# Patient Record
Sex: FEMALE | Race: WHITE | Employment: OTHER | URBAN - METROPOLITAN AREA
[De-identification: names, ages, dates, MRNs, and addresses within clinical notes are randomized per-mention and may not be internally consistent; named-entity substitution may affect disease eponyms.]

---

## 2017-04-18 PROBLEM — R40.20 LOSS OF CONSCIOUSNESS (HCC): Status: ACTIVE | Noted: 2017-04-18

## 2017-05-12 PROBLEM — IMO0001 MILD AORTIC SCLEROSIS: Status: ACTIVE | Noted: 2017-05-12

## 2017-05-18 PROBLEM — E23.6 EMPTY SELLA (HCC): Status: ACTIVE | Noted: 2017-05-18

## 2017-11-09 PROBLEM — M19.90 INFLAMMATORY ARTHRITIS: Status: ACTIVE | Noted: 2017-11-09

## 2018-01-03 PROBLEM — M06.9 RHEUMATOID ARTHRITIS, INVOLVING UNSPECIFIED SITE, UNSPECIFIED RHEUMATOID FACTOR PRESENCE: Status: ACTIVE | Noted: 2018-01-03

## 2018-03-15 PROBLEM — M72.2 PLANTAR FASCIITIS, BILATERAL: Status: ACTIVE | Noted: 2018-03-15

## 2018-06-19 PROBLEM — M06.00 SERONEGATIVE RHEUMATOID ARTHRITIS (HCC): Status: ACTIVE | Noted: 2018-01-03

## 2018-08-02 PROBLEM — L98.9 SKIN LESIONS: Status: ACTIVE | Noted: 2018-08-02

## 2018-08-22 PROBLEM — M19.079 ARTHRITIS OF MIDFOOT: Status: ACTIVE | Noted: 2018-08-22

## 2018-11-08 PROBLEM — M19.90 INFLAMMATORY ARTHRITIS: Status: RESOLVED | Noted: 2017-11-09 | Resolved: 2018-11-08

## 2019-06-14 PROBLEM — R40.20 LOSS OF CONSCIOUSNESS (HCC): Status: RESOLVED | Noted: 2017-04-18 | Resolved: 2019-06-14

## 2019-06-14 PROBLEM — M72.2 PLANTAR FASCIITIS, BILATERAL: Status: RESOLVED | Noted: 2018-03-15 | Resolved: 2019-06-14

## 2019-08-14 PROBLEM — M48.062 SPINAL STENOSIS OF LUMBAR REGION WITH NEUROGENIC CLAUDICATION: Status: ACTIVE | Noted: 2019-08-14

## 2019-10-10 NOTE — PAT NURSING NOTE
Verified with blood bank about T and S was in process yest and this am and now it's for future. Spoke with Isidro that she cancelled the test,there's only one signature on the tube.

## 2019-10-11 PROBLEM — Z01.818 PREOP TESTING: Status: ACTIVE | Noted: 2019-10-11

## 2019-10-11 NOTE — ANESTHESIA POSTPROCEDURE EVALUATION
Patient: Veronica Alvarado    Procedure Summary     Date:  10/11/19 Room / Location:  [address] [place] [address] MAIN OR 09 / 300 [facility] MAIN OR    Anesthesia Start:  1141 Anesthesia Stop:      Procedure:  POSTERIOR LUMBAR INTERBODY FUSION - MIS TLIF 1 LEVEL (Left Back) Diagnosis:

## 2019-10-11 NOTE — ANESTHESIA PROCEDURE NOTES
Airway  Urgency: elective      General Information and Staff    Patient location during procedure: OR  Anesthesiologist: Christiano Lane MD  Resident/CRNA: Kaylee Batista CRNA  Performed: CRNA     Indications and Patient Condition  Indications for air

## 2019-10-11 NOTE — H&P
Progress Notes        Dulce Maria Scott is a 60year old female who presents for a pre-operative physical exam. Patient is to have L4-5 minimally invasive lumbar fusion Dr Fred Gerber on 10/11/19 at [facility] AND [facility].       HPI:   Pt complains of back pa FOOD Disp: 60 tablet Rfl: 5   AMLODIPINE BESYLATE 5 MG Oral Tab TAKE 1 TABLET BY MOUTH EVERY DAY Disp: 90 tablet Rfl: 1   ENALAPRIL MALEATE 20 MG Oral Tab TAKE ONE TABLET BY MOUTH ONE TIME DAILY Disp: 90 tablet Rfl: 1   HYDROCHLOROTHIAZIDE 25 MG Oral Tab T gastric bx negative       Family History   Problem Relation Age of Onset   • Heart Disorder Father           chf   • Other (Other) Father           alzheimers   • Hypertension Mother     • Heart Disorder Mother           s/p thoracic aortic aneurysm repair edema     EKG NSR, no significant ST/T wave changes     ASSESSMENT AND PLAN:   Tim Ventura is a 60year old female who presents for a pre-operative physical exam. Patient is to have L4-5 minimally invasive lumbar fusion Dr Rasta Harris on 10/11 at [facility]

## 2019-10-11 NOTE — OPERATIVE REPORT
Pre-postop dx: spinal stenosis, spondylolisthesis L4-5  Proc;  Left L4-5 MIS TLIF [employer] [place] instr.  And cage, allograft, infuse  Raymond/Galdino  GETA  ebl 20 cc  Drains none  Complications: none  To RR stable  #[id number]

## 2019-10-11 NOTE — OPERATIVE REPORT
[facility]    PATIENT'S NAME: MARY Cadet   ATTENDING PHYSICIAN: Morgan Perrin. Geoffrey Bender MD   OPERATING PHYSICIAN: Morgan Perrin.  Geoffrey Bender MD   PATIENT ACCOUNT#:   [id number]    LOCATION:  [facility] PACU 2 [facility] 10  MEDICAL RECORD #:   [id number]       D leg symptoms, bleeding, infection, dural tear, paralysis, nonunion, degeneration of level above or below, DVT, PE, and anesthetic risks. She appears to understand and has elected to proceed.     OPERATIVE TECHNIQUE:  The patient was given uncomplicated gen entered with a shaver.   Under fluoroscopy, we then placed pedicle screws on the patient's right at L4 and L5, tested the screws with triggered EMGs, noted to be within threshold limits, then did a reduction maneuver with the cortez, stabilizing it on the L5 s of each wound. The deep fascia was closed with #1 Vicryl suture, subcutaneous tissue was closed with 2-0 PDS, skin was closed with 4-0 Monocryl and Dermabond. A sterile, dry dressing was applied.   The patient tolerated the procedure well, was extubated,

## 2019-10-12 NOTE — PLAN OF CARE
Problem: Patient Centered Care  Goal: Patient preferences are identified and integrated in the patient's plan of care  Description  Interventions:  - What would you like us to know as we care for you?   - Provide timely, complete, and accurate informatio appropriate  - Communicate ordered activity level and limitations with patient/family  Outcome: Progressing     Problem: PAIN - ADULT  Goal: Verbalizes/displays adequate comfort level or patient's stated pain goal  Description  INTERVENTIONS:  - Encourage post-discharge preferences of patient/family/discharge partner  - Complete POLST form as appropriate  - Assess patient's ability to be responsible for managing their own health  - Refer to Case Management Department for coordinating discharge planning if t

## 2019-10-12 NOTE — CONSULTS
General Medicine Consult      Reason for consult: medical management    Consulted by: Dr. Daniel Jane    PCP: Finn Cortés MD      History of Present Illness: Patient is a 60year old female with mmp including but not limited to HTN, anxiety/depression, GE daily., Disp: 60 tablet, Rfl: 0  Polyethylene Glycol 3350 (MIRALAX) Oral Powder, Take 17 g by mouth daily. , Disp: 510 g, Rfl: 0  Calcium Carb-Cholecalciferol (CALCIUM 600 + D) 600-200 MG-UNIT Oral Tab, Take 1 tablet by mouth 2 (two) times daily. , Disp: 60 Antacid  500 mg Oral BID   • Vitamin C  1,000 mg Oral BID   • amLODIPine Besylate  5 mg Oral Daily   • DULoxetine HCl  30 mg Oral TID   • Pantoprazole Sodium  20 mg [facility]     Continuous Infusing Medication:  • lactated ringers 20 mL/hr at 10/11/19 13 accessory m use   Head:  Normocephalic, without obvious abnormality, atraumatic. Eyes:  Sclera anicteric,  EOMs intact. Lids wnl.       Ears, nose, throat:  external ears and nose within normal limits, hearing intact       Neck: Supple, symmetrical   Lung

## 2019-10-12 NOTE — PHYSICAL THERAPY NOTE
PHYSICAL THERAPY EVALUATION - INPATIENT     Room Number: 418/418-A  Evaluation Date: 10/12/2019  Type of Evaluation: Initial   Physician Order: PT Eval and Treat    Presenting Problem: pt is s/p lumbar sx see sx report .  pt with spine precautions post sx this time. Pt present status is below the patient's pre-admission status. Pt with min assist for bed mobility using log roll sequencing. Pt with brief min assist for sit to stand transfers cues provided .     Pt with CGA to min assist for ambulation spondylolisthesis. PROCEDURE:    1. L5 laminectomy, decompression of L5 nerve root, posterolateral fusion, posterior lumbar interbody fusion, L4-5 (56305). 2.       Separate L4 laminectomy, decompression of L4 nerve roots bilaterally (66214).   3. Depression    • Empty sella ([facility] - noted on CT 5/18/2017   • Esophageal reflux    • Fibromyalgia    • High blood pressure    • HYPERTENSION    • Incontinence     bladder   • Loss of consciousness ([address] 4/18/2017   • Mild aortic sclerosis ([facility] [address] 5/12/2017 activity post sx pain increased to 7/10   back area   Management Techniques: Activity promotion; Body mechanics; Relaxation;Repositioning    COGNITION  · Overall Cognitive Status:  WFL - within functional limits    RANGE OF MOTION AND STRENGTH ASSESSMENT Modifier (G-Code): CK    FUNCTIONAL ABILITY STATUS  Gait Assessment   Gait Assistance: Minimum assistance;Contact guard assist  Distance (ft): 110 ft x 1   Assistive Device: Rolling walker  Pattern: R Decreased stance time;R Steppage;L Steppage;R Foot flat

## 2019-10-12 NOTE — PROGRESS NOTES
[facility] CL[facility]  Progress Note    Dmitry Whatley Patient Status:  Inpatient    3/4/1960 MRN [id number]   Location [facility] 4W/SW/SE Attending Von Rodríguez MD   Hosp Day # 1 PCP Yari Tracey MD     Subjective:  Dmitry Whatley is a(n)

## 2019-10-12 NOTE — OCCUPATIONAL THERAPY NOTE
OCCUPATIONAL THERAPY EVALUATION - INPATIENT     Room Number: 418/418-A  Evaluation Date: 10/12/2019  Type of Evaluation: Initial       Physician Order: IP Consult to Occupational Therapy  Reason for Therapy: ADL/IADL Dysfunction and Discharge Planning Occupational Therapy needs  at this time. Patient will be discharged from Occupational Therapy services. Please re-order if a new functional limitation presents during this admission.     OCCUPATIONAL THERAPY MEDICAL/SOCIAL HISTORY     Problem List  Active  with me when I shower    OCCUPATIONAL THERAPY EXAMINATION      OBJECTIVE  Precautions: Lumbar brace;Spine  Fall Risk: Standard fall risk             PAIN ASSESSMENT  Ratin  Location: lower back  Management Techniques:  Activity promotion;Reposit

## 2019-10-12 NOTE — PLAN OF CARE
Problem: SKIN/TISSUE INTEGRITY - ADULT  Goal: Incision(s), wounds(s) or drain site(s) healing without S/S of infection  Description  INTERVENTIONS:  - Assess and document risk factors for pressure ulcer development  - Assess and document skin integrity ADULT - FALL  Goal: Free from fall injury  Description  INTERVENTIONS:  - Assess pt frequently for physical needs  - Identify cognitive and physical deficits and behaviors that affect risk of falls. - [place] fall precautions as indicated by assessment.

## 2019-10-13 NOTE — PLAN OF CARE
Problem: DISCHARGE PLANNING  Goal: Discharge to home or other facility with appropriate resources  Description  INTERVENTIONS:  - Identify barriers to discharge w/pt and caregiver  - Include patient/family/discharge partner in discharge Suleman Tilley

## 2019-10-13 NOTE — PLAN OF CARE
Problem: MUSCULOSKELETAL - ADULT  Goal: Return mobility to safest level of function  Description  INTERVENTIONS:  - Assess patient stability and activity tolerance for standing, transferring and ambulating w/ or w/o assistive devices  - Assist with trans Problem: DISCHARGE PLANNING  Goal: Discharge to home or other facility with appropriate resources  Description  INTERVENTIONS:  - Identify barriers to discharge w/pt and caregiver  - Include patient/family/discharge partner in discharge Suleman Tilley

## 2019-10-13 NOTE — PROGRESS NOTES
Brief Internal Medicine Note    Full Note to Follow      Pt sitting up, pain manageable.   No cp/sob/n/v/f/c. +U, +flatus    Exam stable    Ok to d/c from IM standpoint

## 2019-10-13 NOTE — DISCHARGE SUMMARY
=    General Medicine Discharge Summary     Patient ID:  Smiley Thomas  60year old  [id number]  3/4/1960    Admit date: 10/11/2019    Discharge date and time: 10/13/2019 11:39 AM     Attending/consult physician  Richard Willoughby MD  Primary Care Physici TECHNIQUE:   Intraoperative exam was performed. Total fluoroscopy time was 80.9 seconds. A total of 2 images were obtained. There has been postoperative changes of pedicle screw and posterior fusion with interbody spacer at L4-L5.          CONCLUSION: Intr Disp-90 tablet, R-0    ENALAPRIL MALEATE 20 MG Oral Tab  TAKE ONE TABLET BY MOUTH ONE TIME DAILY, Normal, Disp-90 tablet, R-0    DULOXETINE HCL 30 MG Oral Cap DR Particles  TAKE ONE CAPSULE BY MOUTH THREE TIMES DAILY, Normal, Disp-90 capsule, R-0    preg[facility] [id number] [place] [address] Follow Up with Alejandra Murrieta MD   Tuesday Dec 10, 2019 10:20 AM           Total Time Coordinating Care: Greater than 30 minutes    Patient had opportunity to ask questions and state understand and

## 2019-10-17 PROBLEM — Z98.890 HISTORY OF BACK SURGERY: Status: ACTIVE | Noted: 2019-10-17

## 2019-10-17 PROBLEM — L98.9 SKIN LESIONS: Status: RESOLVED | Noted: 2018-08-02 | Resolved: 2019-10-17

## 2019-10-17 PROBLEM — Z01.818 PREOP TESTING: Status: RESOLVED | Noted: 2019-10-11 | Resolved: 2019-10-17

## 2020-01-27 PROBLEM — R23.4 FISSURE IN SKIN OF FOOT: Status: ACTIVE | Noted: 2018-08-02

## 2020-01-27 PROBLEM — M06.9 RHEUMATOID ARTHRITIS, INVOLVING UNSPECIFIED SITE, UNSPECIFIED RHEUMATOID FACTOR PRESENCE: Status: ACTIVE | Noted: 2017-11-09

## 2021-02-22 PROBLEM — M54.17 L-S RADICULOPATHY: Status: ACTIVE | Noted: 2021-02-22

## 2021-03-26 PROBLEM — Z98.890 S/P LUMBAR MICRODISCECTOMY: Status: ACTIVE | Noted: 2021-03-26

## 2021-03-26 PROBLEM — M46.92 CERVICAL SPONDYLITIS WITH RADICULITIS (HCC): Status: ACTIVE | Noted: 2021-03-26

## 2021-03-26 PROBLEM — M54.12 CERVICAL SPONDYLITIS WITH RADICULITIS (HCC): Status: ACTIVE | Noted: 2021-03-26

## 2021-12-30 PROBLEM — Z98.1 S/P LUMBAR FUSION: Status: ACTIVE | Noted: 2021-03-26

## 2022-01-31 PROBLEM — R23.4 FISSURE IN SKIN OF FOOT: Status: RESOLVED | Noted: 2018-08-02 | Resolved: 2022-01-31

## 2022-01-31 PROBLEM — M06.9 RHEUMATOID ARTHRITIS, INVOLVING UNSPECIFIED SITE, UNSPECIFIED RHEUMATOID FACTOR PRESENCE: Status: RESOLVED | Noted: 2017-11-09 | Resolved: 2022-01-31

## 2022-07-22 NOTE — ANESTHESIA POSTPROCEDURE EVALUATION
Patient: Rosa Maria Mercado    Procedure Summary     Date: 07/22/22 Room / Location: [address] [place] [address] MAIN OR 04 / [address] [place] [address] MAIN OR    Anesthesia Start: 0732 Anesthesia Stop:     Procedures:       Right lateral fusion lumbar 2-3, cage, lumbar 5-sacral 1, posterior lumbar interbody fusion with instrumentation lumbar 1- Pelvis, cages and allograft (Right Spine Lumbar)      posterior lumbar interbody fusion (N/A Spine Lumbar) Diagnosis: (degenerative disc disease, retained hardware, herniated nucleus pulposus)    Surgeons: Elizabeth Waters MD Anesthesiologist: Russ Pleitez MD    Anesthesia Type: general ASA Status: 3          Anesthesia Type: general    Vitals Value Taken Time   /90 07/22/22 1358   Temp 36.7 07/22/22 1400   Pulse 44 07/22/22 1400   Resp 9 07/22/22 1400   SpO2 100 % 07/22/22 1400   Vitals shown include unvalidated device data. [address] [place] [address] AN Post Evaluation:   Patient Evaluated in PACU  Patient Participation: complete - patient cannot participate  Level of Consciousness: Post-procedure mental status: sleepy.   Pain Score: 0  Pain Management: adequate  Airway Patency:patent  Yes    Cardiovascular Status: acceptable  Respiratory Status: acceptable  Postoperative Hydration acceptable  Comments: Sleepy; on Non-rebreather mask 8 L/min;     IVF 2000 mL  EBL ~400 (600 mL - 200 mL Cell Saver)  Urine output 350 mL          Anmol Thomas CRNA  7/22/2022 2:00 PM

## 2022-07-22 NOTE — ANESTHESIA PROCEDURE NOTES
Airway  Date/Time: 7/22/2022 7:37 AM  Urgency: Elective    Airway not difficult    General Information and Staff    Patient location during procedure: OR  Anesthesiologist: Mladonado Abreu MD  Resident/CRNA: Johanne Gallardo CRNA  Performed: CRNA     Indications and Patient Condition  Indications for airway management: anesthesia  Sedation level: deep  Preoxygenated: yes  Patient position: sniffing  Mask difficulty assessment: 1 - vent by mask    Final Airway Details  Final airway type: endotracheal airway      Successful airway: ETT  Cuffed: yes   Successful intubation technique: direct laryngoscopy  Endotracheal tube insertion site: oral  Blade: [place]  Blade size: #4  ETT size (mm): 7.5    Cormack-Lehane Classification: grade I - full view of glottis  Placement verified by: chest auscultation and capnometry   Cuff volume (mL): 3  Measured from: teeth  ETT to teeth (cm): 21  Number of attempts at approach: 1

## 2022-07-22 NOTE — OPERATIVE REPORT
Pre-postop dx:  Kyphosis L2-3 with DDD, left L5-S1 far lateral HNP, retained hardware  Proc:  Right L2-3 XLIF with [place] 13 mm hyperlordotic cage, infuse, PSF L2-3 with formal Ramirez-gooden osteotomies L2-3, left L5-S1 PLF, PLIF with 9 mm [employer]craft cages, revision of hardware, L1-Pelvis instrumentation, allograft, autograft, intrathecal block  Raymond/Galdino  GETA  Ebl: 650 ml, 250 ml back as CS  Drains: hv x 2 in back  Complications: none  To RR stable  #[id number]

## 2022-07-23 NOTE — CM/SW NOTE
07/23/22 0841   /SW Referral Data   Referral Source Physician   Reason for Referral Discharge planning   Informant Patient   Pertinent Medical Hx   Does patient have an established PCP? Yes   Patient Info   Patient's Current Mental Status at Time of Assessment Alert   Patient's [address]   Number of Levels in Home 1   Number of Stair in Home   (3)   Patient Status Prior to Admission   Independent with ADLs and Mobility Yes  (uses cane)   Discharge Needs   Anticipated D/C needs No anticipated discharge needs     PT/OT to see this am.  Patient up in chair, mood good. / to remain available for support and/or discharge planning.      Dara Munoz MBA BSN RN [address]  RN Case Manager  [phone]

## 2022-07-23 NOTE — CM/SW NOTE
07/23/22 0800   CM/SW Referral Data   Referral Source Physician   Reason for Referral Discharge planning   Informant Patient   Pertinent Medical Hx   Does patient have an established PCP? Yes   Patient Info   Patient's Current Mental Status at Time of Assessment Alert   Patient's [address]   Number of Levels in Home 1   Number of Stair in Home   (3)   Patient Status Prior to Admission   Independent with ADLs and Mobility Yes   Services in place prior to admission   (used cane)   Discharge Needs   Anticipated D/C needs No anticipated discharge needs; Home health care; Outpatient therapy

## 2022-07-23 NOTE — PLAN OF CARE
Patient ambulates 1 person assist with walker. Pain managed with PCA pump. SCDs and SANTOSH hose for DVT prophylaxis. Patient voiding with pina. Surgical dressing clean, dry, and intact. Hemovac in place, output chart in Epic. No acute changes. Vitals signs as charted. Patient in lowest position, bed alarm on, call light within reach, using appropriately.  Plan for PT/OT evaluation this AM.     Problem: Patient Centered Care  Goal: Patient preferences are identified and integrated in the patient's plan of care  Description: Interventions:  - What would you like us to know as we care for you?   - Provide timely, complete, and accurate information to patient/family  - Incorporate patient and family knowledge, values, beliefs, and cultural backgrounds into the planning and delivery of care  - Encourage patient/family to participate in care and decision-making at the level they choose  - Honor patient and family perspectives and choices  Outcome: Progressing     Problem: PAIN - ADULT  Goal: Verbalizes/displays adequate comfort level or patient's stated pain goal  Description: INTERVENTIONS:  - Encourage pt to monitor pain and request assistance  - Assess pain using appropriate pain scale  - Administer analgesics based on type and severity of pain and evaluate response  - Implement non-pharmacological measures as appropriate and evaluate response  - Consider cultural and social influences on pain and pain management  - Manage/alleviate anxiety  - Utilize distraction and/or relaxation techniques  - Monitor for opioid side effects  - Notify MD/LIP if interventions unsuccessful or patient reports new pain  - Anticipate increased pain with activity and pre-medicate as appropriate  Outcome: Progressing     Problem: RISK FOR INFECTION - ADULT  Goal: Absence of fever/infection during anticipated neutropenic period  Description: INTERVENTIONS  - Monitor WBC  - Administer growth factors as ordered  - Implement neutropenic guidelines  Outcome: Progressing     Problem: SAFETY ADULT - FALL  Goal: Free from fall injury  Description: INTERVENTIONS:  - Assess pt frequently for physical needs  - Identify cognitive and physical deficits and behaviors that affect risk of falls.   - [place] fall precautions as indicated by assessment.  - Educate pt/family on patient safety including physical limitations  - Instruct pt to call for assistance with activity based on assessment  - Modify environment to reduce risk of injury  - Provide assistive devices as appropriate  - Consider OT/PT consult to assist with strengthening/mobility  - Encourage toileting schedule  Outcome: Progressing     Problem: DISCHARGE PLANNING  Goal: Discharge to home or other facility with appropriate resources  Description: INTERVENTIONS:  - Identify barriers to discharge w/pt and caregiver  - Include patient/family/discharge partner in discharge planning  - Arrange for needed discharge resources and transportation as appropriate  - Identify discharge learning needs (meds, wound care, etc)  - Arrange for interpreters to assist at discharge as needed  - Consider post-discharge preferences of patient/family/discharge partner  - Complete POLST form as appropriate  - Assess patient's ability to be responsible for managing their own health  - Refer to Case Management Department for coordinating discharge planning if the patient needs post-hospital services based on physician/LIP order or complex needs related to functional status, cognitive ability or social support system  Outcome: Progressing

## 2022-07-24 NOTE — PLAN OF CARE
Patient alert and oriented. Post-op day #2. Dressing in place to medial back, changed by PA this AM. Gel ice in place. LSO to be in place when out of bed. Order faxed to [facility], awaiting delivery. VSS. Receiving IV fluids per MD order. Tolerating diet. Voiding freely. SCDs and Teds for DVT prophylaxis. Percocet and zanaflex provided as needed for pain. Up with standby assist and a walker. Encouraged frequent ambulation and use of incentive spirometer. Fall precautions maintained. Frequent rounding by nursing staff. Plan is home with no needs tomorrow.     Problem: Patient Centered Care  Goal: Patient preferences are identified and integrated in the patient's plan of care  Description: Interventions:  - What would you like us to know as we care for you?   - Provide timely, complete, and accurate information to patient/family  - Incorporate patient and family knowledge, values, beliefs, and cultural backgrounds into the planning and delivery of care  - Encourage patient/family to participate in care and decision-making at the level they choose  - Honor patient and family perspectives and choices  Outcome: Progressing     Problem: PAIN - ADULT  Goal: Verbalizes/displays adequate comfort level or patient's stated pain goal  Description: INTERVENTIONS:  - Encourage pt to monitor pain and request assistance  - Assess pain using appropriate pain scale  - Administer analgesics based on type and severity of pain and evaluate response  - Implement non-pharmacological measures as appropriate and evaluate response  - Consider cultural and social influences on pain and pain management  - Manage/alleviate anxiety  - Utilize distraction and/or relaxation techniques  - Monitor for opioid side effects  - Notify MD/LIP if interventions unsuccessful or patient reports new pain  - Anticipate increased pain with activity and pre-medicate as appropriate  Outcome: Progressing     Problem: RISK FOR INFECTION - ADULT  Goal: Absence of fever/infection during anticipated neutropenic period  Description: INTERVENTIONS  - Monitor WBC  - Administer growth factors as ordered  - Implement neutropenic guidelines  Outcome: Progressing     Problem: SAFETY ADULT - FALL  Goal: Free from fall injury  Description: INTERVENTIONS:  - Assess pt frequently for physical needs  - Identify cognitive and physical deficits and behaviors that affect risk of falls.   - [place] fall precautions as indicated by assessment.  - Educate pt/family on patient safety including physical limitations  - Instruct pt to call for assistance with activity based on assessment  - Modify environment to reduce risk of injury  - Provide assistive devices as appropriate  - Consider OT/PT consult to assist with strengthening/mobility  - Encourage toileting schedule  Outcome: Progressing     Problem: DISCHARGE PLANNING  Goal: Discharge to home or other facility with appropriate resources  Description: INTERVENTIONS:  - Identify barriers to discharge w/pt and caregiver  - Include patient/family/discharge partner in discharge planning  - Arrange for needed discharge resources and transportation as appropriate  - Identify discharge learning needs (meds, wound care, etc)  - Arrange for interpreters to assist at discharge as needed  - Consider post-discharge preferences of patient/family/discharge partner  - Complete POLST form as appropriate  - Assess patient's ability to be responsible for managing their own health  - Refer to Case Management Department for coordinating discharge planning if the patient needs post-hospital services based on physician/LIP order or complex needs related to functional status, cognitive ability or social support system  Outcome: Progressing

## 2022-07-24 NOTE — PLAN OF CARE
Patient alert and oriented. Post-op day #1. Dressing in place to medial back. Gel ice in place. VSS. Receiving IV fluids per MD order. Hemovac drain removed by MD this AM, no output. Tolerating diet. Rojas removed, voiding freely. SCDs and Teds for DVT prophylaxis. PCA pump discontinued, Percocet provided as needed for pain. Up with standby assist and a walker. Encouraged frequent ambulation and use of incentive spirometer. Fall precautions maintained- bed alarm on, bed locked in lowest position, call light and personal belongings within reach, non-skid socks in place to bilateral feet. Frequent rounding by nursing staff. Plan is home with no needs pending medical clearance.      Problem: Patient Centered Care  Goal: Patient preferences are identified and integrated in the patient's plan of care  Description: Interventions:  - What would you like us to know as we care for you?   - Provide timely, complete, and accurate information to patient/family  - Incorporate patient and family knowledge, values, beliefs, and cultural backgrounds into the planning and delivery of care  - Encourage patient/family to participate in care and decision-making at the level they choose  - Honor patient and family perspectives and choices  Outcome: Progressing     Problem: PAIN - ADULT  Goal: Verbalizes/displays adequate comfort level or patient's stated pain goal  Description: INTERVENTIONS:  - Encourage pt to monitor pain and request assistance  - Assess pain using appropriate pain scale  - Administer analgesics based on type and severity of pain and evaluate response  - Implement non-pharmacological measures as appropriate and evaluate response  - Consider cultural and social influences on pain and pain management  - Manage/alleviate anxiety  - Utilize distraction and/or relaxation techniques  - Monitor for opioid side effects  - Notify MD/LIP if interventions unsuccessful or patient reports new pain  - Anticipate increased pain with activity and pre-medicate as appropriate  Outcome: Progressing     Problem: RISK FOR INFECTION - ADULT  Goal: Absence of fever/infection during anticipated neutropenic period  Description: INTERVENTIONS  - Monitor WBC  - Administer growth factors as ordered  - Implement neutropenic guidelines  Outcome: Progressing     Problem: SAFETY ADULT - FALL  Goal: Free from fall injury  Description: INTERVENTIONS:  - Assess pt frequently for physical needs  - Identify cognitive and physical deficits and behaviors that affect risk of falls.   - [place] fall precautions as indicated by assessment.  - Educate pt/family on patient safety including physical limitations  - Instruct pt to call for assistance with activity based on assessment  - Modify environment to reduce risk of injury  - Provide assistive devices as appropriate  - Consider OT/PT consult to assist with strengthening/mobility  - Encourage toileting schedule  Outcome: Progressing     Problem: DISCHARGE PLANNING  Goal: Discharge to home or other facility with appropriate resources  Description: INTERVENTIONS:  - Identify barriers to discharge w/pt and caregiver  - Include patient/family/discharge partner in discharge planning  - Arrange for needed discharge resources and transportation as appropriate  - Identify discharge learning needs (meds, wound care, etc)  - Arrange for interpreters to assist at discharge as needed  - Consider post-discharge preferences of patient/family/discharge partner  - Complete POLST form as appropriate  - Assess patient's ability to be responsible for managing their own health  - Refer to Case Management Department for coordinating discharge planning if the patient needs post-hospital services based on physician/LIP order or complex needs related to functional status, cognitive ability or social support system  Outcome: Progressing

## 2022-07-24 NOTE — PLAN OF CARE
No acute changes overnight. Pain control with PRN percocet. Tele monitoring in place. Pt still needs LSO brace delivered. Ambulating with SBA and walker. Plan to discharge home when cleared.      Problem: Patient Centered Care  Goal: Patient preferences are identified and integrated in the patient's plan of care  Description: Interventions:  - What would you like us to know as we care for you?   - Provide timely, complete, and accurate information to patient/family  - Incorporate patient and family knowledge, values, beliefs, and cultural backgrounds into the planning and delivery of care  - Encourage patient/family to participate in care and decision-making at the level they choose  - Honor patient and family perspectives and choices  Outcome: Progressing     Problem: PAIN - ADULT  Goal: Verbalizes/displays adequate comfort level or patient's stated pain goal  Description: INTERVENTIONS:  - Encourage pt to monitor pain and request assistance  - Assess pain using appropriate pain scale  - Administer analgesics based on type and severity of pain and evaluate response  - Implement non-pharmacological measures as appropriate and evaluate response  - Consider cultural and social influences on pain and pain management  - Manage/alleviate anxiety  - Utilize distraction and/or relaxation techniques  - Monitor for opioid side effects  - Notify MD/LIP if interventions unsuccessful or patient reports new pain  - Anticipate increased pain with activity and pre-medicate as appropriate  Outcome: Progressing     Problem: RISK FOR INFECTION - ADULT  Goal: Absence of fever/infection during anticipated neutropenic period  Description: INTERVENTIONS  - Monitor WBC  - Administer growth factors as ordered  - Implement neutropenic guidelines  Outcome: Progressing     Problem: SAFETY ADULT - FALL  Goal: Free from fall injury  Description: INTERVENTIONS:  - Assess pt frequently for physical needs  - Identify cognitive and physical deficits and behaviors that affect risk of falls.   - [place] fall precautions as indicated by assessment.  - Educate pt/family on patient safety including physical limitations  - Instruct pt to call for assistance with activity based on assessment  - Modify environment to reduce risk of injury  - Provide assistive devices as appropriate  - Consider OT/PT consult to assist with strengthening/mobility  - Encourage toileting schedule  Outcome: Progressing     Problem: DISCHARGE PLANNING  Goal: Discharge to home or other facility with appropriate resources  Description: INTERVENTIONS:  - Identify barriers to discharge w/pt and caregiver  - Include patient/family/discharge partner in discharge planning  - Arrange for needed discharge resources and transportation as appropriate  - Identify discharge learning needs (meds, wound care, etc)  - Arrange for interpreters to assist at discharge as needed  - Consider post-discharge preferences of patient/family/discharge partner  - Complete POLST form as appropriate  - Assess patient's ability to be responsible for managing their own health  - Refer to Case Management Department for coordinating discharge planning if the patient needs post-hospital services based on physician/LIP order or complex needs related to functional status, cognitive ability or social support system  Outcome: Progressing

## 2022-07-24 NOTE — PHYSICAL THERAPY NOTE
PHYSICAL THERAPY TREATMENT NOTE - INPATIENT     Room Number: 228/552-V       Presenting Problem: L2/3,L5/S1 fusion, removal and reinsertion of hardward L1-S1    Problem List  Active Problems:    s/p left L1-2 XLIF, left L3-L4 MIS TLIF; 12/21/2021      PHYSICAL THERAPY ASSESSMENT   Chart reviewed. RN Lesley Hilliard approved participation in physical therapy. PPE worn by therapist: mask and gloves. Patient was wearing a mask during session. Patient presented in bed with 8/10 pain. Patient with good  progress towards goals during this session. Education provided on Spine precautions, Physical therapy plan of care and physiological benefits of out of bed mobility. Patient with good carryover. Pt is received sitting EOB with RN present and was cleared for therapy session. Pt has been up ad edgar in her room per pt. Pt is mod I with sit<>stand transfers with the RW. Pt was able to AMB about 200' with the RW SBA. Pt with decreased marie and step length but with very good balance and safety awareness. Pt AMB to the stair well for stair training. Pt was educated and able to negotiate 3 stairs with 1 HR SBA. Pt is cued for proper technique. Returned pt back to the room and to sitting in the chair with all needs within reach. Pt reviewed and maintained all spinal precautions. Pt is on track to dc to home once medically cleared. Reported to the RN on the status of the pt. Bed mobility: NT  Transfers: Modified independent  Gait Assistance: Supervision  Distance (ft): 200'  Assistive Device: Rolling walker  Pattern:  (narrow GEORGE)          . Patient was left in bedside chair at end of session with all needs in reach. The patient's Approx Degree of Impairment: 28.97% has been calculated based on documentation in the [facility] '6 clicks' Inpatient Basic Mobility Short Form. Research supports that patients with this level of impairment may benefit from Home with no services. RN aware of patient status post session.     DISCHARGE RECOMMENDATIONS  PT Discharge Recommendations: Home; Intermittent Supervision     PLAN  PT Treatment Plan: Bed mobility; Body mechanics; Patient education;Gait training;Strengthening;Stoop training;Stair training;Transfer training;Balance training    SUBJECTIVE  Pt was agreeable to therapy session. OBJECTIVE  Precautions: Spine    WEIGHT BEARING RESTRICTION                   PAIN ASSESSMENT   Ratin  Location: back  Management Techniques: Activity promotion; Body mechanics; Relaxation;Repositioning    BALANCE                                                                                                                       Static Sitting: Good  Dynamic Sitting: Fair +           Static Standing: Fair  Dynamic Standing: Fair    ACTIVITY TOLERANCE                         O2 WALK       AM-PAC '6-Clicks' INPATIENT SHORT FORM - BASIC MOBILITY  How much difficulty does the patient currently have. .. Patient Difficulty: Turning over in bed (including adjusting bedclothes, sheets and blankets)?: None   Patient Difficulty: Sitting down on and standing up from a chair with arms (e.g., wheelchair, bedside commode, etc.): None   Patient Difficulty: Moving from lying on back to sitting on the side of the bed?: None   How much help from another person does the patient currently need. .. Help from Another: Moving to and from a bed to a chair (including a wheelchair)?: A Little   Help from Another: Need to walk in hospital room?: A Little   Help from Another: Climbing 3-5 steps with a railing?: A Little     AM-PAC Score:  Raw Score: 21   Approx Degree of Impairment: 28.97%   Standardized Score (AM-PAC Scale): 50.25   CMS Modifier (G-Code): CJ          Patient End of Session: Up in chair;Needs met;Call light within reach;RN aware of session/findings; All patient questions and concerns addressed    CURRENT GOALS     Goals to be met by: 2022  Patient Goal Patient's self-stated goal is: to go home   Goal #1 Patient is able to demonstrate supine - sit EOB @ level: independent     Goal #1   Current Status NT received sitting EOB   Goal #2 Patient is able to demonstrate transfers EOB to/from [facility] at assistance level: independent with none     Goal #2  Current Status Mod I with the RW   Goal #3 Patient is able to ambulate 400 feet with assist device: none at assistance level: independent   Goal #3   Current Status 200' with the RW SBA   Goal #4 Patient will negotiate 3 stairs/one curb w/ assistive device and supervision   Goal #4   Current Status 3 stairs with 1HR SBA   Goal #5 Patient to demonstrate independence with home activity/exercise instructions provided to patient in preparation for discharge.    Goal #5   Current Status IN PROGRESS   Goal #6    Goal #6  Current Status

## 2022-07-25 NOTE — PLAN OF CARE
POD 3. A&O x4. CPAP on overnight. Remote tele. Voiding freely. Pain managed with percocet and zanaflex PRN. Up with standby assist and walker. LSO brace when ambulating. Plan is to discharge home when cleared.     Problem: Patient Centered Care  Goal: Patient preferences are identified and integrated in the patient's plan of care  Description: Interventions:  - What would you like us to know as we care for you?   - Provide timely, complete, and accurate information to patient/family  - Incorporate patient and family knowledge, values, beliefs, and cultural backgrounds into the planning and delivery of care  - Encourage patient/family to participate in care and decision-making at the level they choose  - Honor patient and family perspectives and choices  Outcome: Progressing     Problem: PAIN - ADULT  Goal: Verbalizes/displays adequate comfort level or patient's stated pain goal  Description: INTERVENTIONS:  - Encourage pt to monitor pain and request assistance  - Assess pain using appropriate pain scale  - Administer analgesics based on type and severity of pain and evaluate response  - Implement non-pharmacological measures as appropriate and evaluate response  - Consider cultural and social influences on pain and pain management  - Manage/alleviate anxiety  - Utilize distraction and/or relaxation techniques  - Monitor for opioid side effects  - Notify MD/LIP if interventions unsuccessful or patient reports new pain  - Anticipate increased pain with activity and pre-medicate as appropriate  Outcome: Progressing     Problem: RISK FOR INFECTION - ADULT  Goal: Absence of fever/infection during anticipated neutropenic period  Description: INTERVENTIONS  - Monitor WBC  - Administer growth factors as ordered  - Implement neutropenic guidelines  Outcome: Progressing     Problem: SAFETY ADULT - FALL  Goal: Free from fall injury  Description: INTERVENTIONS:  - Assess pt frequently for physical needs  - Identify cognitive and physical deficits and behaviors that affect risk of falls.   - [place] fall precautions as indicated by assessment.  - Educate pt/family on patient safety including physical limitations  - Instruct pt to call for assistance with activity based on assessment  - Modify environment to reduce risk of injury  - Provide assistive devices as appropriate  - Consider OT/PT consult to assist with strengthening/mobility  - Encourage toileting schedule  Outcome: Progressing     Problem: DISCHARGE PLANNING  Goal: Discharge to home or other facility with appropriate resources  Description: INTERVENTIONS:  - Identify barriers to discharge w/pt and caregiver  - Include patient/family/discharge partner in discharge planning  - Arrange for needed discharge resources and transportation as appropriate  - Identify discharge learning needs (meds, wound care, etc)  - Arrange for interpreters to assist at discharge as needed  - Consider post-discharge preferences of patient/family/discharge partner  - Complete POLST form as appropriate  - Assess patient's ability to be responsible for managing their own health  - Refer to Case Management Department for coordinating discharge planning if the patient needs post-hospital services based on physician/LIP order or complex needs related to functional status, cognitive ability or social support system  Outcome: Progressing

## 2022-07-25 NOTE — PLAN OF CARE
Problem: Patient Centered Care  Goal: Patient preferences are identified and integrated in the patient's plan of care  Description: Interventions:  - What would you like us to know as we care for you? From home with  and he's her support system.   - Provide timely, complete, and accurate information to patient/family  - Incorporate patient and family knowledge, values, beliefs, and cultural backgrounds into the planning and delivery of care  - Encourage patient/family to participate in care and decision-making at the level they choose  - Honor patient and family perspectives and choices  7/25/2022 1446 by Kathy Greco RN  Outcome: Adequate for Discharge  7/25/2022 1443 by Kathy Greco RN  Outcome: Adequate for Discharge  7/25/2022 1159 by Kathy Greco RN  Outcome: Progressing     Problem: PAIN - ADULT  Goal: Verbalizes/displays adequate comfort level or patient's stated pain goal  Description: INTERVENTIONS:  - Encourage pt to monitor pain and request assistance  - Assess pain using appropriate pain scale  - Administer analgesics based on type and severity of pain and evaluate response  - Implement non-pharmacological measures as appropriate and evaluate response  - Consider cultural and social influences on pain and pain management  - Manage/alleviate anxiety  - Utilize distraction and/or relaxation techniques  - Monitor for opioid side effects  - Notify MD/LIP if interventions unsuccessful or patient reports new pain  - Anticipate increased pain with activity and pre-medicate as appropriate  7/25/2022 1446 by Kathy Greco RN  Outcome: Adequate for Discharge  7/25/2022 1443 by Kathy Greco RN  Outcome: Adequate for Discharge  7/25/2022 1159 by Kathy Greco RN  Outcome: Progressing     Problem: RISK FOR INFECTION - ADULT  Goal: Absence of fever/infection during anticipated neutropenic period  Description: INTERVENTIONS  - Monitor WBC  - Administer growth factors as ordered  - Implement neutropenic guidelines  7/25/2022 1446 by Rika Mccracken RN  Outcome: Adequate for Discharge  7/25/2022 1443 by Rika Mccracken RN  Outcome: Adequate for Discharge  7/25/2022 1159 by Rika Mccracken RN  Outcome: Progressing     Problem: SAFETY ADULT - FALL  Goal: Free from fall injury  Description: INTERVENTIONS:  - Assess pt frequently for physical needs  - Identify cognitive and physical deficits and behaviors that affect risk of falls.   - [place] fall precautions as indicated by assessment.  - Educate pt/family on patient safety including physical limitations  - Instruct pt to call for assistance with activity based on assessment  - Modify environment to reduce risk of injury  - Provide assistive devices as appropriate  - Consider OT/PT consult to assist with strengthening/mobility  - Encourage toileting schedule  7/25/2022 1446 by Rika Mccracken RN  Outcome: Adequate for Discharge  7/25/2022 1443 by Rika Mccracken RN  Outcome: Adequate for Discharge  7/25/2022 1159 by Rika Mccracken RN  Outcome: Progressing     Problem: DISCHARGE PLANNING  Goal: Discharge to home or other facility with appropriate resources  Description: INTERVENTIONS:  - Identify barriers to discharge w/pt and caregiver  - Include patient/family/discharge partner in discharge planning  - Arrange for needed discharge resources and transportation as appropriate  - Identify discharge learning needs (meds, wound care, etc)  - Arrange for interpreters to assist at discharge as needed  - Consider post-discharge preferences of patient/family/discharge partner  - Complete POLST form as appropriate  - Assess patient's ability to be responsible for managing their own health  - Refer to Case Management Department for coordinating discharge planning if the patient needs post-hospital services based on physician/LIP order or complex needs related to functional status, cognitive ability or social support system  7/25/2022 1446 by Antonia Vivar RN  Outcome: Adequate for Discharge  7/25/2022 1443 by Antonia Vivar RN  Outcome: Adequate for Discharge  7/25/2022 1159 by Antonia Vivar RN  Outcome: Progressing     Problem: Patient/Family Goals  Goal: Patient/Family Long Term Goal  Description: Patient's Long Term Goal: Patient will have no complications from surgery. Interventions:  - Up with walker and LSO brace as much as tolerated. - No bending, heavy lifting nor twisting above waist area. - Monitor incision for any signs of infection.  - Pain management with oral medication.  - May use ice to incision to prevent swelling or help reduce pain. - SANTOSH hose to bilateral legs to prevent blood clots. - See additional Care Plan goals for specific interventions  7/25/2022 1446 by Antonia Vivar RN  Outcome: Adequate for Discharge  7/25/2022 1443 by Antonia Vivar RN  Outcome: Adequate for Discharge  7/25/2022 1159 by Antonia Vivar RN  Outcome: Progressing  Goal: Patient/Family Short Term Goal  Description: Patient's Short Term Goal: Home when stable. Interventions:   - No bending, heavy lifting nor twisting above waist area. - Monitor incision for any signs of infection.  - Pain management with oral medication.  - May use ice to incision to prevent swelling or help reduce pain. - SANTOSH hose/SCD's to bilateral legs to prevent blood clots.  - PT/OT as ordered. - Up with walker and LSO brace as much as tolerated.   - See additional Care Plan goals for specific interventions  7/25/2022 1446 by Antonia Vivar RN  Outcome: Adequate for Discharge  7/25/2022 1443 by Antonia Vivar RN  Outcome: Adequate for Discharge  7/25/2022 1159 by Antonia Vivar RN  Outcome: Progressing     Problem: SKIN/TISSUE INTEGRITY - ADULT  Goal: Incision(s), wounds(s) or drain site(s) healing without S/S of infection  Description: INTERVENTIONS:  - Assess and document risk factors for pressure ulcer development  - Assess and document skin integrity  - Assess and document dressing/incision, wound bed, drain sites and surrounding tissue  - Implement wound care per orders  - Initiate isolation precautions as appropriate  - Initiate Pressure Ulcer prevention bundle as indicated  7/25/2022 1446 by Kathy Greco RN  Outcome: Adequate for Discharge  7/25/2022 1443 by Kathy Greco RN  Outcome: Adequate for Discharge  7/25/2022 1159 by Kathy Greco RN  Outcome: Progressing     Problem: MUSCULOSKELETAL - ADULT  Goal: Return mobility to safest level of function  Description: INTERVENTIONS:  - Assess patient stability and activity tolerance for standing, transferring and ambulating w/ or w/o assistive devices  - Assist with transfers and ambulation using safe patient handling equipment as needed  - Ensure adequate protection for wounds/incisions during mobilization  - Obtain PT/OT consults as needed  - Advance activity as appropriate  - Communicate ordered activity level and limitations with patient/family  7/25/2022 1446 by Kathy Greco RN  Outcome: Adequate for Discharge  7/25/2022 1443 by Kathy Greco RN  Outcome: Adequate for Discharge  7/25/2022 1159 by Kathy Greco RN  Outcome: Progressing  Goal: Maintain proper alignment of affected body part  Description: INTERVENTIONS:  - Support and protect limb and body alignment per provider's orders  - Instruct and reinforce with patient and family use of appropriate assistive device and precautions (e.g. spinal or hip dislocation precautions)  7/25/2022 1446 by Kathy Greco RN  Outcome: Adequate for Discharge  7/25/2022 1443 by Kathy Greco RN  Outcome: Adequate for Discharge  7/25/2022 1159 by Kathy Greco RN  Outcome: Progressing

## 2022-07-25 NOTE — PLAN OF CARE
Problem: Patient Centered Care  Goal: Patient preferences are identified and integrated in the patient's plan of care  Description: Interventions:  - What would you like us to know as we care for you? From home with  and he's her support system. - Provide timely, complete, and accurate information to patient/family  - Incorporate patient and family knowledge, values, beliefs, and cultural backgrounds into the planning and delivery of care  - Encourage patient/family to participate in care and decision-making at the level they choose  - Honor patient and family perspectives and choices  Outcome: Progressing     Problem: PAIN - ADULT  Goal: Verbalizes/displays adequate comfort level or patient's stated pain goal  Description: INTERVENTIONS:  - Encourage pt to monitor pain and request assistance  - Assess pain using appropriate pain scale  - Administer analgesics based on type and severity of pain and evaluate response  - Implement non-pharmacological measures as appropriate and evaluate response  - Consider cultural and social influences on pain and pain management  - Manage/alleviate anxiety  - Utilize distraction and/or relaxation techniques  - Monitor for opioid side effects  - Notify MD/LIP if interventions unsuccessful or patient reports new pain  - Anticipate increased pain with activity and pre-medicate as appropriate  Outcome: Progressing     Problem: RISK FOR INFECTION - ADULT  Goal: Absence of fever/infection during anticipated neutropenic period  Description: INTERVENTIONS  - Monitor WBC  - Administer growth factors as ordered  - Implement neutropenic guidelines  Outcome: Progressing     Problem: SAFETY ADULT - FALL  Goal: Free from fall injury  Description: INTERVENTIONS:  - Assess pt frequently for physical needs  - Identify cognitive and physical deficits and behaviors that affect risk of falls.   - [place] fall precautions as indicated by assessment.  - Educate pt/family on patient safety including physical limitations  - Instruct pt to call for assistance with activity based on assessment  - Modify environment to reduce risk of injury  - Provide assistive devices as appropriate  - Consider OT/PT consult to assist with strengthening/mobility  - Encourage toileting schedule  Outcome: Progressing     Problem: DISCHARGE PLANNING  Goal: Discharge to home or other facility with appropriate resources  Description: INTERVENTIONS:  - Identify barriers to discharge w/pt and caregiver  - Include patient/family/discharge partner in discharge planning  - Arrange for needed discharge resources and transportation as appropriate  - Identify discharge learning needs (meds, wound care, etc)  - Arrange for interpreters to assist at discharge as needed  - Consider post-discharge preferences of patient/family/discharge partner  - Complete POLST form as appropriate  - Assess patient's ability to be responsible for managing their own health  - Refer to Case Management Department for coordinating discharge planning if the patient needs post-hospital services based on physician/LIP order or complex needs related to functional status, cognitive ability or social support system  Outcome: Progressing     Problem: Patient/Family Goals  Goal: Patient/Family Long Term Goal  Description: Patient's Long Term Goal: Patient will have no complications from surgery. Interventions:  - Up with walker and LSO brace as much as tolerated. - No bending, heavy lifting nor twisting above waist area. - Monitor incision for any signs of infection.  - Pain management with oral medication.  - May use ice to incision to prevent swelling or help reduce pain. - SANTOSH hose to bilateral legs to prevent blood clots. - See additional Care Plan goals for specific interventions  Outcome: Progressing  Goal: Patient/Family Short Term Goal  Description: Patient's Short Term Goal: Home when stable.     Interventions:   - No bending, heavy lifting nor twisting above waist area. - Monitor incision for any signs of infection.  - Pain management with oral medication.  - May use ice to incision to prevent swelling or help reduce pain. - SANTOSH hose/SCD's to bilateral legs to prevent blood clots.  - PT/OT as ordered. - Up with walker and LSO brace as much as tolerated. - See additional Care Plan goals for specific interventions  Outcome: Progressing     Problem: SKIN/TISSUE INTEGRITY - ADULT  Goal: Incision(s), wounds(s) or drain site(s) healing without S/S of infection  Description: INTERVENTIONS:  - Assess and document risk factors for pressure ulcer development  - Assess and document skin integrity  - Assess and document dressing/incision, wound bed, drain sites and surrounding tissue  - Implement wound care per orders  - Initiate isolation precautions as appropriate  - Initiate Pressure Ulcer prevention bundle as indicated  Outcome: Progressing     Problem: MUSCULOSKELETAL - ADULT  Goal: Return mobility to safest level of function  Description: INTERVENTIONS:  - Assess patient stability and activity tolerance for standing, transferring and ambulating w/ or w/o assistive devices  - Assist with transfers and ambulation using safe patient handling equipment as needed  - Ensure adequate protection for wounds/incisions during mobilization  - Obtain PT/OT consults as needed  - Advance activity as appropriate  - Communicate ordered activity level and limitations with patient/family  Outcome: Progressing  Goal: Maintain proper alignment of affected body part  Description: INTERVENTIONS:  - Support and protect limb and body alignment per provider's orders  - Instruct and reinforce with patient and family use of appropriate assistive device and precautions (e.g. spinal or hip dislocation precautions)  Outcome: Progressing    Patient is alert and oriented, aware to call for help as needed. Patient is currently in room air, denies shortness of breathing nor chest pain. Patient is up with walker, awaits for her LSO brace to be delivered. Patient's pain is managed with oral medication. Patient is voiding well, passing gas but denies have a bowel movement. Patient will be going home this afternoon with .

## 2022-07-25 NOTE — PHYSICAL THERAPY NOTE
PHYSICAL THERAPY TREATMENT NOTE - INPATIENT     Room Number: 324/494-X       Presenting Problem: L2/3,L5/S1 fusion, removal and reinsertion of hardward L1-S1       Problem List  Active Problems:    s/p left L1-2 XLIF, left L3-L4 MIS TLIF; 2021      PHYSICAL THERAPY ASSESSMENT     Pt seen daily. Chart reviewed,RN approved pt participation in PT RX. CGA for bed mobility and transfer;extra time provided to complete task. Spinal precautions reviewed;education;pt recall 3/3 spinal precautions. Pt amb 2 x 100 ft with RW and CGA;provided cuing for gait pattern as well as for postural awareness. Navigated 4 stairs with CGA. Ther ex;pt ended session sitting up in chair;call light within reach. RN aware. Family present;family education;all questions and concerns addressed. The patient's Approx Degree of Impairment: 28.97% has been calculated based on documentation in the [facility] '6 clicks' Inpatient Basic Mobility Short Form. Research supports that patients with this level of impairment may benefit from Home ; Intermittent supervision. Alex Hwang DISCHARGE RECOMMENDATIONS  PT Discharge Recommendations: Home; Intermittent Supervision     PLAN  PT Treatment Plan: Bed mobility; Endurance; Patient education;Gait training  Frequency (Obs): Daily    SUBJECTIVE  Pt reports being ready for PT RX    OBJECTIVE  Precautions: Spine    WEIGHT BEARING RESTRICTION                PAIN ASSESSMENT   Ratin  Location: back  Management Techniques: Activity promotion; Body mechanics; Relaxation;Repositioning    BALANCE  Static Sitting: Good  Dynamic Sitting: Fair +  Static Standing: Fair  Dynamic Standing: Fair    ACTIVITY TOLERANCE                          O2 WALK       AM-PAC '6-Clicks' INPATIENT SHORT FORM - BASIC MOBILITY  How much difficulty does the patient currently have. ..   Patient Difficulty: Turning over in bed (including adjusting bedclothes, sheets and blankets)?: None   Patient Difficulty: Sitting down on and standing up from a chair with arms (e.g., wheelchair, bedside commode, etc.): None   Patient Difficulty: Moving from lying on back to sitting on the side of the bed?: None   How much help from another person does the patient currently need. .. Help from Another: Moving to and from a bed to a chair (including a wheelchair)?: A Little   Help from Another: Need to walk in hospital room?: A Little   Help from Another: Climbing 3-5 steps with a railing?: A Little     AM-PAC Score:  Raw Score: 21   Approx Degree of Impairment: 28.97%   Standardized Score (AM-PAC Scale): 50.25   CMS Modifier (G-Code): CJ    FUNCTIONAL ABILITY STATUS  Functional Mobility/Gait Assessment  Gait Assistance: Contact guard assist  Distance (ft): 2 x 100  Assistive Device: Rolling walker  Pattern: Shuffle  Stairs: Stairs  How Many Stairs: 4  Device: 1 Rail  Assist: Contact guard assist  Pattern: Ascend and Descend  Ascend and Descend : Step to    Additional information:     THERAPEUTIC EXERCISES  Lower Extremity Ankle pumps  Glut sets  Hip AB/AD  Quad sets     Position Supine       Patient End of Session: Up in chair;Call light within reach;RN aware of session/findings;Bracing education provided per handout; All patient questions and concerns addressed    CURRENT GOALS     Patient Goal Patient's self-stated goal is: to go home   Goal #1 Patient is able to demonstrate supine - sit EOB @ level: independent     Goal #1   Current Status CGA   Goal #2 Patient is able to demonstrate transfers EOB to/from [facility] at assistance level: independent with none     Goal #2  Current Status CGA with the RW   Goal #3 Patient is able to ambulate 400 feet with assist device: none at assistance level: independent   Goal #3   Current Status 2  X 100' with the RW CGA   Goal #4 Patient will negotiate 3 stairs/one curb w/ assistive device and supervision   Goal #4   Current Status 4 stairs with 1HR CGA   Goal #5 Patient to demonstrate independence with home activity/exercise instructions provided to patient in preparation for discharge.    Goal #5   Current Status IN PROGRESS   Goal #6    Goal #6  Current Status

## 2022-07-29 NOTE — H&P
[facility]    PATIENT'S NAME: MARY Long   ATTENDING PHYSICIAN: Earnest Zarate. Tamara Rizo MD   PATIENT ACCOUNT#:   [id number]    LOCATION:  [address] [place] [address] #:   [id number]       YOB: 1960  ADMISSION DATE:       07/22/2022    HISTORY AND PHYSICAL EXAMINATION    HISTORY OF PRESENT ILLNESS:  A 69-year-old female who has had progressive back and left leg pain. Multiple previous surgeries. Patient admitted for lateral fusion and laminectomy and fusion with instrumentation. MEDICATIONS:  Norco 10 three tablets per day, duloxetine 30 mg daily, enalapril 20 mg per day, Lyrica 200 mg b.i.d., amlodipine 5 mg per day. ALLERGIES:  None. SOCIAL HISTORY:  She works in an office. She does not smoke. REVIEW OF SYSTEMS:  Negative. PHYSICAL EXAMINATION:    GENERAL:  Healthy-appearing female. NEUROLOGIC:  Walking with a walker because of her severe back and left leg pain. Mild weakness, left EHL and foot dorsiflexors. Decreased sensation in the left L5 dermatome. Positive SLR on the left. Reproducible radicular pain on the left leg. Multiple posterior lumbar incisions. Minimal range of motion of the lumbar spine, secondary to back and left leg pain. MRI shows severe spinal stenosis on the left side at L5-S1 in the foramen, as well as some kyphosis of the upper lumbar spine. Previous multiple-level fusions of the lumbar spine. ASSESSMENT:  Spinal stenosis and kyphosis. PLAN:  Lateral fusion L2-3, posterior instrumentation revision with fusion L1 to pelvis with exchange of instrumentation, laminectomy, and facet osteotomies. Risks, benefits and alternatives explained to the patient in detail. She appears to understand and has elected to proceed. All questions answered. Dictated By Lakesha Rizo MD  d: 07/28/2022 15:01:11  t: 07/28/2022 17:12:55  Job [id number]  TNN/

## 2023-05-30 NOTE — ANESTHESIA PREPROCEDURE EVALUATION
Anesthesia PreOp Note    HPI:     Deniz Prince is a 60year old female who presents for preoperative consultation requested by:  Shun Us MD    Date of Surgery: 10/11/2019    Procedure(s):  POSTERIOR LUMBAR INTERBODY FUSION - MIS TLIF 1 LEVEL  I Incontinence     bladder   • Loss of consciousness ([facility] [address] 4/18/2017   • Mild aortic sclerosis ([facility] [address] 5/12/2017   • Myofascial muscle pain    • BELKIS on CPAP    • OSTEOARTHRITIS     thumb   • Osteoarthritis    • OTHER DISEASES     cts bilat   • OTHER DISEASES 10/9/2019   Multiple Vitamins-Minerals (ONE DAILY WOMENS 50+) Oral Tab Take  by mouth. Disp:  Rfl:  10/4/2019   B Complex-C-Folic Acid (HM VITAMIN B COMPLEX/VITAMIN C) Oral Tab Take  by mouth daily.  Disp:  Rfl:  10/4/2019   [DISCONTINUED] Cyclobenzaprine H CRNA 5 mg at 10/11/19 1416   dexamethasone Sodium Phosphate (DECADRON) 4 MG/ML injection  Intravenous PRN rEiberto Pugh, CRNA 10 mg at 10/11/19 1422     Current Outpatient Medications Ordered in Epic:  Cyclobenzaprine HCl 10 MG Oral Tab Take 1 tablet (10 m Medical: Not on file        Non-medical: Not on file    Tobacco Use      Smoking status: Former Smoker        Years: 17.00        Types: Cigarettes        Quit date: 1998        Years since quittin.2      Smokeless tobacco: Never Used    [employer] stressful. Commutes 15 minutes to work. Lives in house. She cannot take care of yardwork. Finances - mother helps her financially [place] [address] [place] [place]; ; specializes in desserts .  caffeine: 3 daybl General  Monitors and Lines:   SSEP/MEP  Airway:  ETT  Post-op Pain Management: IV analgesics, Local and IV PCA  Informed Consent Plan and Risks Discussed With:  Patient and spouse  Use of Blood Products Discussed With:  Patient and spouse  Blood Product U Skin normal color for race, warm, dry and intact. No evidence of rash.

## 2024-05-06 ENCOUNTER — HOSPITAL ENCOUNTER (OUTPATIENT)
Dept: GENERAL RADIOLOGY | Age: 64
Discharge: HOME | End: 2024-05-06
Attending: ORTHOPAEDIC SURGERY
Payer: MEDICARE

## 2024-05-06 ENCOUNTER — LAB ENCOUNTER (OUTPATIENT)
Dept: LAB | Age: 64
End: 2024-05-06
Attending: ORTHOPAEDIC SURGERY
Payer: MEDICARE

## 2024-05-06 DIAGNOSIS — Z01.818 PREOP TESTING: ICD-10-CM

## 2024-05-06 LAB
ANTIBODY SCREEN: NEGATIVE
RH BLOOD TYPE: POSITIVE

## 2024-05-06 PROCEDURE — 86901 BLOOD TYPING SEROLOGIC RH(D): CPT

## 2024-05-06 PROCEDURE — 86850 RBC ANTIBODY SCREEN: CPT

## 2024-05-06 PROCEDURE — 71046 X-RAY EXAM CHEST 2 VIEWS: CPT | Performed by: ORTHOPAEDIC SURGERY

## 2024-05-06 PROCEDURE — 86900 BLOOD TYPING SEROLOGIC ABO: CPT

## 2024-05-06 RX ORDER — GOLIMUMAB 50 MG/.5ML
50 INJECTION, SOLUTION SUBCUTANEOUS
COMMUNITY
Start: 2023-11-10

## 2024-05-06 RX ORDER — HYDROCODONE BITARTRATE AND ACETAMINOPHEN 10; 325 MG/1; MG/1
1 TABLET ORAL EVERY 6 HOURS PRN
COMMUNITY
Start: 2024-05-06 | End: 2024-05-11

## 2024-05-06 RX ORDER — CYANOCOBALAMIN (VITAMIN B-12) 500 MCG
1 TABLET ORAL DAILY
COMMUNITY

## 2024-05-06 RX ORDER — PREDNISONE 5 MG/1
7.5 TABLET ORAL DAILY
COMMUNITY
Start: 2023-11-13

## 2024-05-08 ENCOUNTER — APPOINTMENT (OUTPATIENT)
Dept: GENERAL RADIOLOGY | Facility: HOSPITAL | Age: 64
DRG: 460 | End: 2024-05-08
Attending: ORTHOPAEDIC SURGERY

## 2024-05-08 ENCOUNTER — ANESTHESIA EVENT (OUTPATIENT)
Dept: SURGERY | Facility: HOSPITAL | Age: 64
DRG: 460 | End: 2024-05-08
Payer: MEDICARE

## 2024-05-08 ENCOUNTER — HOSPITAL ENCOUNTER (INPATIENT)
Facility: HOSPITAL | Age: 64
LOS: 3 days | Discharge: HOME HEALTH | DRG: 460 | End: 2024-05-11
Attending: ORTHOPAEDIC SURGERY | Admitting: ORTHOPAEDIC SURGERY

## 2024-05-08 ENCOUNTER — ANESTHESIA (OUTPATIENT)
Dept: SURGERY | Facility: HOSPITAL | Age: 64
DRG: 460 | End: 2024-05-08
Payer: MEDICARE

## 2024-05-08 DIAGNOSIS — Z01.818 PREOP TESTING: Primary | ICD-10-CM

## 2024-05-08 DIAGNOSIS — Z98.1 S/P FUSION OF THORACIC SPINE: ICD-10-CM

## 2024-05-08 PROCEDURE — 00NX0ZZ RELEASE THORACIC SPINAL CORD, OPEN APPROACH: ICD-10-PCS | Performed by: ORTHOPAEDIC SURGERY

## 2024-05-08 PROCEDURE — 4A133B1 MONITORING OF ARTERIAL PRESSURE, PERIPHERAL, PERCUTANEOUS APPROACH: ICD-10-PCS | Performed by: ANESTHESIOLOGY

## 2024-05-08 PROCEDURE — 0PS404Z REPOSITION THORACIC VERTEBRA WITH INTERNAL FIXATION DEVICE, OPEN APPROACH: ICD-10-PCS | Performed by: ORTHOPAEDIC SURGERY

## 2024-05-08 PROCEDURE — P9045 ALBUMIN (HUMAN), 5%, 250 ML: HCPCS

## 2024-05-08 PROCEDURE — 03HY32Z INSERTION OF MONITORING DEVICE INTO UPPER ARTERY, PERCUTANEOUS APPROACH: ICD-10-PCS | Performed by: ANESTHESIOLOGY

## 2024-05-08 PROCEDURE — 76000 FLUOROSCOPY <1 HR PHYS/QHP: CPT | Performed by: ORTHOPAEDIC SURGERY

## 2024-05-08 PROCEDURE — P9045 ALBUMIN (HUMAN), 5%, 250 ML: HCPCS | Performed by: ANESTHESIOLOGY

## 2024-05-08 PROCEDURE — 4A133J1 MONITORING OF ARTERIAL PULSE, PERIPHERAL, PERCUTANEOUS APPROACH: ICD-10-PCS | Performed by: ANESTHESIOLOGY

## 2024-05-08 PROCEDURE — 0RGA071 FUSION OF THORACOLUMBAR VERTEBRAL JOINT WITH AUTOLOGOUS TISSUE SUBSTITUTE, POSTERIOR APPROACH, POSTERIOR COLUMN, OPEN APPROACH: ICD-10-PCS | Performed by: ORTHOPAEDIC SURGERY

## 2024-05-08 PROCEDURE — 00NY0ZZ RELEASE LUMBAR SPINAL CORD, OPEN APPROACH: ICD-10-PCS | Performed by: ORTHOPAEDIC SURGERY

## 2024-05-08 PROCEDURE — 0RBB0ZZ EXCISION OF THORACOLUMBAR VERTEBRAL DISC, OPEN APPROACH: ICD-10-PCS | Performed by: ORTHOPAEDIC SURGERY

## 2024-05-08 PROCEDURE — 0RG7071 FUSION OF 2 TO 7 THORACIC VERTEBRAL JOINTS WITH AUTOLOGOUS TISSUE SUBSTITUTE, POSTERIOR APPROACH, POSTERIOR COLUMN, OPEN APPROACH: ICD-10-PCS | Performed by: ORTHOPAEDIC SURGERY

## 2024-05-08 PROCEDURE — 88300 SURGICAL PATH GROSS: CPT | Performed by: ORTHOPAEDIC SURGERY

## 2024-05-08 DEVICE — IMPLANTABLE DEVICE: Type: IMPLANTABLE DEVICE | Site: BACK | Status: FUNCTIONAL

## 2024-05-08 DEVICE — IMPLANTABLE DEVICE: Type: IMPLANTABLE DEVICE | Status: FUNCTIONAL

## 2024-05-08 DEVICE — GRAFT BNE SUB CHIP 30CC ALLGRFT CANC FD: Type: IMPLANTABLE DEVICE | Site: BACK | Status: FUNCTIONAL

## 2024-05-08 DEVICE — ONE (1) PACKAGE - CONTAINING 10.0CC
Type: IMPLANTABLE DEVICE | Site: BACK | Status: FUNCTIONAL
Brand: OSTEOSELECT PLUS DBM PUTTY 10.0CC

## 2024-05-08 DEVICE — BONE GRAFT KIT 7510600 INFUSE LARGE
Type: IMPLANTABLE DEVICE | Site: BACK | Status: FUNCTIONAL
Brand: INFUSE® BONE GRAFT

## 2024-05-08 DEVICE — SET SCR SPNL T30 AST TECH: Type: IMPLANTABLE DEVICE | Site: BACK | Status: FUNCTIONAL

## 2024-05-08 RX ORDER — DIAZEPAM 2 MG/1
2 TABLET ORAL EVERY 6 HOURS PRN
Status: DISCONTINUED | OUTPATIENT
Start: 2024-05-08 | End: 2024-05-11

## 2024-05-08 RX ORDER — EPHEDRINE SULFATE 50 MG/ML
INJECTION, SOLUTION INTRAVENOUS AS NEEDED
Status: DISCONTINUED | OUTPATIENT
Start: 2024-05-08 | End: 2024-05-08 | Stop reason: SURG

## 2024-05-08 RX ORDER — DIAZEPAM 5 MG/1
5 TABLET ORAL ONCE
Status: COMPLETED | OUTPATIENT
Start: 2024-05-08 | End: 2024-05-08

## 2024-05-08 RX ORDER — ROCURONIUM BROMIDE 10 MG/ML
INJECTION, SOLUTION INTRAVENOUS AS NEEDED
Status: DISCONTINUED | OUTPATIENT
Start: 2024-05-08 | End: 2024-05-08 | Stop reason: SURG

## 2024-05-08 RX ORDER — LIDOCAINE HYDROCHLORIDE ANHYDROUS AND DEXTROSE MONOHYDRATE .8; 5 G/100ML; G/100ML
INJECTION, SOLUTION INTRAVENOUS CONTINUOUS PRN
Status: DISCONTINUED | OUTPATIENT
Start: 2024-05-08 | End: 2024-05-08 | Stop reason: SURG

## 2024-05-08 RX ORDER — HYDROMORPHONE HYDROCHLORIDE 1 MG/ML
0.4 INJECTION, SOLUTION INTRAMUSCULAR; INTRAVENOUS; SUBCUTANEOUS EVERY 2 HOUR PRN
Status: DISCONTINUED | OUTPATIENT
Start: 2024-05-08 | End: 2024-05-09

## 2024-05-08 RX ORDER — MINERAL OIL AND PETROLATUM 150; 830 MG/G; MG/G
OINTMENT OPHTHALMIC AS NEEDED
Status: DISCONTINUED | OUTPATIENT
Start: 2024-05-08 | End: 2024-05-08 | Stop reason: SURG

## 2024-05-08 RX ORDER — ALBUMIN, HUMAN INJ 5% 5 %
SOLUTION INTRAVENOUS CONTINUOUS PRN
Status: DISCONTINUED | OUTPATIENT
Start: 2024-05-08 | End: 2024-05-08 | Stop reason: SURG

## 2024-05-08 RX ORDER — SENNOSIDES 8.6 MG
17.2 TABLET ORAL NIGHTLY
Status: DISCONTINUED | OUTPATIENT
Start: 2024-05-08 | End: 2024-05-11

## 2024-05-08 RX ORDER — ASCORBIC ACID 500 MG
1000 TABLET ORAL
Status: DISCONTINUED | OUTPATIENT
Start: 2024-05-09 | End: 2024-05-11

## 2024-05-08 RX ORDER — VANCOMYCIN HYDROCHLORIDE 1 G/20ML
INJECTION, POWDER, LYOPHILIZED, FOR SOLUTION INTRAVENOUS AS NEEDED
Status: DISCONTINUED | OUTPATIENT
Start: 2024-05-08 | End: 2024-05-08 | Stop reason: HOSPADM

## 2024-05-08 RX ORDER — ENEMA 19; 7 G/133ML; G/133ML
1 ENEMA RECTAL ONCE AS NEEDED
Status: DISCONTINUED | OUTPATIENT
Start: 2024-05-08 | End: 2024-05-11

## 2024-05-08 RX ORDER — BISACODYL 10 MG
10 SUPPOSITORY, RECTAL RECTAL
Status: DISCONTINUED | OUTPATIENT
Start: 2024-05-08 | End: 2024-05-11

## 2024-05-08 RX ORDER — TRANEXAMIC ACID 10 MG/ML
INJECTION, SOLUTION INTRAVENOUS CONTINUOUS PRN
Status: DISCONTINUED | OUTPATIENT
Start: 2024-05-08 | End: 2024-05-08 | Stop reason: HOSPADM

## 2024-05-08 RX ORDER — KETAMINE HYDROCHLORIDE 50 MG/ML
INJECTION, SOLUTION INTRAMUSCULAR; INTRAVENOUS AS NEEDED
Status: DISCONTINUED | OUTPATIENT
Start: 2024-05-08 | End: 2024-05-08 | Stop reason: SURG

## 2024-05-08 RX ORDER — SODIUM CHLORIDE, SODIUM LACTATE, POTASSIUM CHLORIDE, CALCIUM CHLORIDE 600; 310; 30; 20 MG/100ML; MG/100ML; MG/100ML; MG/100ML
INJECTION, SOLUTION INTRAVENOUS CONTINUOUS
Status: DISCONTINUED | OUTPATIENT
Start: 2024-05-08 | End: 2024-05-11

## 2024-05-08 RX ORDER — GLYCOPYRROLATE 0.2 MG/ML
INJECTION, SOLUTION INTRAMUSCULAR; INTRAVENOUS AS NEEDED
Status: DISCONTINUED | OUTPATIENT
Start: 2024-05-08 | End: 2024-05-08 | Stop reason: SURG

## 2024-05-08 RX ORDER — DEXAMETHASONE SODIUM PHOSPHATE 4 MG/ML
10 VIAL (ML) INJECTION ONCE
Status: COMPLETED | OUTPATIENT
Start: 2024-05-09 | End: 2024-05-09

## 2024-05-08 RX ORDER — OXYCODONE HYDROCHLORIDE 5 MG/1
5 TABLET ORAL EVERY 4 HOURS PRN
Status: DISCONTINUED | OUTPATIENT
Start: 2024-05-08 | End: 2024-05-09

## 2024-05-08 RX ORDER — POLYETHYLENE GLYCOL 3350 17 G/17G
17 POWDER, FOR SOLUTION ORAL DAILY PRN
Status: DISCONTINUED | OUTPATIENT
Start: 2024-05-08 | End: 2024-05-11

## 2024-05-08 RX ORDER — NALOXONE HYDROCHLORIDE 0.4 MG/ML
0.08 INJECTION, SOLUTION INTRAMUSCULAR; INTRAVENOUS; SUBCUTANEOUS
Status: DISCONTINUED | OUTPATIENT
Start: 2024-05-08 | End: 2024-05-11

## 2024-05-08 RX ORDER — SODIUM CHLORIDE, SODIUM LACTATE, POTASSIUM CHLORIDE, CALCIUM CHLORIDE 600; 310; 30; 20 MG/100ML; MG/100ML; MG/100ML; MG/100ML
INJECTION, SOLUTION INTRAVENOUS CONTINUOUS
Status: DISCONTINUED | OUTPATIENT
Start: 2024-05-08 | End: 2024-05-08 | Stop reason: HOSPADM

## 2024-05-08 RX ORDER — SODIUM CHLORIDE 9 MG/ML
INJECTION, SOLUTION INTRAVENOUS CONTINUOUS PRN
Status: DISCONTINUED | OUTPATIENT
Start: 2024-05-08 | End: 2024-05-08 | Stop reason: SURG

## 2024-05-08 RX ORDER — CEFAZOLIN SODIUM/WATER 2 G/20 ML
2 SYRINGE (ML) INTRAVENOUS EVERY 8 HOURS
Qty: 40 ML | Refills: 0 | Status: COMPLETED | OUTPATIENT
Start: 2024-05-08 | End: 2024-05-09

## 2024-05-08 RX ORDER — TIZANIDINE 2 MG/1
2 TABLET ORAL EVERY 6 HOURS PRN
Status: DISCONTINUED | OUTPATIENT
Start: 2024-05-08 | End: 2024-05-11

## 2024-05-08 RX ORDER — HYDROMORPHONE HYDROCHLORIDE 1 MG/ML
0.4 INJECTION, SOLUTION INTRAMUSCULAR; INTRAVENOUS; SUBCUTANEOUS EVERY 5 MIN PRN
Status: DISCONTINUED | OUTPATIENT
Start: 2024-05-08 | End: 2024-05-08 | Stop reason: HOSPADM

## 2024-05-08 RX ORDER — ONDANSETRON 2 MG/ML
4 INJECTION INTRAMUSCULAR; INTRAVENOUS EVERY 6 HOURS PRN
Status: DISCONTINUED | OUTPATIENT
Start: 2024-05-08 | End: 2024-05-11

## 2024-05-08 RX ORDER — FAMOTIDINE 20 MG/1
20 TABLET, FILM COATED ORAL ONCE
Status: DISCONTINUED | OUTPATIENT
Start: 2024-05-08 | End: 2024-05-08 | Stop reason: HOSPADM

## 2024-05-08 RX ORDER — FAMOTIDINE 10 MG/ML
20 INJECTION, SOLUTION INTRAVENOUS ONCE
Status: DISCONTINUED | OUTPATIENT
Start: 2024-05-08 | End: 2024-05-08 | Stop reason: HOSPADM

## 2024-05-08 RX ORDER — LIDOCAINE HYDROCHLORIDE 10 MG/ML
INJECTION, SOLUTION EPIDURAL; INFILTRATION; INTRACAUDAL; PERINEURAL AS NEEDED
Status: DISCONTINUED | OUTPATIENT
Start: 2024-05-08 | End: 2024-05-08 | Stop reason: SURG

## 2024-05-08 RX ORDER — DOCUSATE SODIUM 100 MG/1
100 CAPSULE, LIQUID FILLED ORAL 2 TIMES DAILY
Status: DISCONTINUED | OUTPATIENT
Start: 2024-05-08 | End: 2024-05-11

## 2024-05-08 RX ORDER — METOCLOPRAMIDE HYDROCHLORIDE 5 MG/ML
10 INJECTION INTRAMUSCULAR; INTRAVENOUS EVERY 8 HOURS PRN
Status: DISCONTINUED | OUTPATIENT
Start: 2024-05-08 | End: 2024-05-11

## 2024-05-08 RX ORDER — OXYCODONE HYDROCHLORIDE 5 MG/1
10 TABLET ORAL ONCE
Status: DISCONTINUED | OUTPATIENT
Start: 2024-05-08 | End: 2024-05-08 | Stop reason: HOSPADM

## 2024-05-08 RX ORDER — MORPHINE SULFATE 4 MG/ML
4 INJECTION, SOLUTION INTRAMUSCULAR; INTRAVENOUS EVERY 10 MIN PRN
Status: DISCONTINUED | OUTPATIENT
Start: 2024-05-08 | End: 2024-05-08 | Stop reason: HOSPADM

## 2024-05-08 RX ORDER — NALOXONE HYDROCHLORIDE 0.4 MG/ML
0.08 INJECTION, SOLUTION INTRAMUSCULAR; INTRAVENOUS; SUBCUTANEOUS AS NEEDED
Status: DISCONTINUED | OUTPATIENT
Start: 2024-05-08 | End: 2024-05-08 | Stop reason: HOSPADM

## 2024-05-08 RX ORDER — MIDAZOLAM HYDROCHLORIDE 1 MG/ML
INJECTION INTRAMUSCULAR; INTRAVENOUS AS NEEDED
Status: DISCONTINUED | OUTPATIENT
Start: 2024-05-08 | End: 2024-05-08 | Stop reason: SURG

## 2024-05-08 RX ORDER — MORPHINE SULFATE 4 MG/ML
2 INJECTION, SOLUTION INTRAMUSCULAR; INTRAVENOUS EVERY 10 MIN PRN
Status: DISCONTINUED | OUTPATIENT
Start: 2024-05-08 | End: 2024-05-08 | Stop reason: HOSPADM

## 2024-05-08 RX ORDER — DEXAMETHASONE SODIUM PHOSPHATE 4 MG/ML
VIAL (ML) INJECTION AS NEEDED
Status: DISCONTINUED | OUTPATIENT
Start: 2024-05-08 | End: 2024-05-08

## 2024-05-08 RX ORDER — ONDANSETRON 2 MG/ML
INJECTION INTRAMUSCULAR; INTRAVENOUS AS NEEDED
Status: DISCONTINUED | OUTPATIENT
Start: 2024-05-08 | End: 2024-05-08 | Stop reason: SURG

## 2024-05-08 RX ORDER — BUPIVACAINE HYDROCHLORIDE AND EPINEPHRINE 5; 5 MG/ML; UG/ML
INJECTION, SOLUTION PERINEURAL AS NEEDED
Status: DISCONTINUED | OUTPATIENT
Start: 2024-05-08 | End: 2024-05-08 | Stop reason: HOSPADM

## 2024-05-08 RX ORDER — OXYCODONE HYDROCHLORIDE 5 MG/1
10 TABLET ORAL EVERY 4 HOURS PRN
Status: DISCONTINUED | OUTPATIENT
Start: 2024-05-08 | End: 2024-05-09

## 2024-05-08 RX ORDER — ACETAMINOPHEN 500 MG
1000 TABLET ORAL ONCE
Status: COMPLETED | OUTPATIENT
Start: 2024-05-08 | End: 2024-05-08

## 2024-05-08 RX ORDER — HYDROMORPHONE HYDROCHLORIDE 1 MG/ML
0.2 INJECTION, SOLUTION INTRAMUSCULAR; INTRAVENOUS; SUBCUTANEOUS EVERY 5 MIN PRN
Status: DISCONTINUED | OUTPATIENT
Start: 2024-05-08 | End: 2024-05-08 | Stop reason: HOSPADM

## 2024-05-08 RX ORDER — PHENYLEPHRINE HCL 10 MG/ML
VIAL (ML) INJECTION AS NEEDED
Status: DISCONTINUED | OUTPATIENT
Start: 2024-05-08 | End: 2024-05-08 | Stop reason: SURG

## 2024-05-08 RX ORDER — HYDROMORPHONE HYDROCHLORIDE 1 MG/ML
0.6 INJECTION, SOLUTION INTRAMUSCULAR; INTRAVENOUS; SUBCUTANEOUS EVERY 5 MIN PRN
Status: DISCONTINUED | OUTPATIENT
Start: 2024-05-08 | End: 2024-05-08 | Stop reason: HOSPADM

## 2024-05-08 RX ORDER — MORPHINE SULFATE 10 MG/ML
6 INJECTION, SOLUTION INTRAMUSCULAR; INTRAVENOUS EVERY 10 MIN PRN
Status: DISCONTINUED | OUTPATIENT
Start: 2024-05-08 | End: 2024-05-08 | Stop reason: HOSPADM

## 2024-05-08 RX ORDER — CEFAZOLIN SODIUM/WATER 2 G/20 ML
2 SYRINGE (ML) INTRAVENOUS ONCE
Status: COMPLETED | OUTPATIENT
Start: 2024-05-08 | End: 2024-05-08

## 2024-05-08 RX ADMIN — PHENYLEPHRINE HCL 100 MCG: 10 MG/ML VIAL (ML) INJECTION at 14:23:00

## 2024-05-08 RX ADMIN — ROCURONIUM BROMIDE 45 MG: 10 INJECTION, SOLUTION INTRAVENOUS at 14:12:00

## 2024-05-08 RX ADMIN — SODIUM CHLORIDE, SODIUM LACTATE, POTASSIUM CHLORIDE, CALCIUM CHLORIDE: 600; 310; 30; 20 INJECTION, SOLUTION INTRAVENOUS at 17:35:00

## 2024-05-08 RX ADMIN — PHENYLEPHRINE HCL 100 MCG: 10 MG/ML VIAL (ML) INJECTION at 16:14:00

## 2024-05-08 RX ADMIN — LIDOCAINE HYDROCHLORIDE 50 MG: 10 INJECTION, SOLUTION EPIDURAL; INFILTRATION; INTRACAUDAL; PERINEURAL at 14:10:00

## 2024-05-08 RX ADMIN — ALBUMIN, HUMAN INJ 5%: 5 SOLUTION INTRAVENOUS at 15:37:00

## 2024-05-08 RX ADMIN — PHENYLEPHRINE HCL 200 MCG: 10 MG/ML VIAL (ML) INJECTION at 17:02:00

## 2024-05-08 RX ADMIN — EPHEDRINE SULFATE 10 MG: 50 INJECTION, SOLUTION INTRAVENOUS at 17:02:00

## 2024-05-08 RX ADMIN — SODIUM CHLORIDE: 9 INJECTION, SOLUTION INTRAVENOUS at 14:37:00

## 2024-05-08 RX ADMIN — ROCURONIUM BROMIDE 20 MG: 10 INJECTION, SOLUTION INTRAVENOUS at 15:09:00

## 2024-05-08 RX ADMIN — EPHEDRINE SULFATE 5 MG: 50 INJECTION, SOLUTION INTRAVENOUS at 16:14:00

## 2024-05-08 RX ADMIN — PHENYLEPHRINE HCL 100 MCG: 10 MG/ML VIAL (ML) INJECTION at 14:27:00

## 2024-05-08 RX ADMIN — PHENYLEPHRINE HCL 100 MCG: 10 MG/ML VIAL (ML) INJECTION at 16:23:00

## 2024-05-08 RX ADMIN — EPHEDRINE SULFATE 5 MG: 50 INJECTION, SOLUTION INTRAVENOUS at 17:29:00

## 2024-05-08 RX ADMIN — MINERAL OIL AND PETROLATUM 1 APPLICATION: 150; 830 OINTMENT OPHTHALMIC at 14:11:00

## 2024-05-08 RX ADMIN — ONDANSETRON 4 MG: 2 INJECTION INTRAMUSCULAR; INTRAVENOUS at 16:17:00

## 2024-05-08 RX ADMIN — GLYCOPYRROLATE 0.1 MG: 0.2 INJECTION, SOLUTION INTRAMUSCULAR; INTRAVENOUS at 14:06:00

## 2024-05-08 RX ADMIN — PHENYLEPHRINE HCL 100 MCG: 10 MG/ML VIAL (ML) INJECTION at 15:18:00

## 2024-05-08 RX ADMIN — ROCURONIUM BROMIDE 5 MG: 10 INJECTION, SOLUTION INTRAVENOUS at 14:10:00

## 2024-05-08 RX ADMIN — SODIUM CHLORIDE: 9 INJECTION, SOLUTION INTRAVENOUS at 16:52:00

## 2024-05-08 RX ADMIN — PHENYLEPHRINE HCL 200 MCG: 10 MG/ML VIAL (ML) INJECTION at 17:15:00

## 2024-05-08 RX ADMIN — KETAMINE HYDROCHLORIDE 50 MG: 50 INJECTION, SOLUTION INTRAMUSCULAR; INTRAVENOUS at 15:06:00

## 2024-05-08 RX ADMIN — EPHEDRINE SULFATE 5 MG: 50 INJECTION, SOLUTION INTRAVENOUS at 16:23:00

## 2024-05-08 RX ADMIN — EPHEDRINE SULFATE 10 MG: 50 INJECTION, SOLUTION INTRAVENOUS at 17:15:00

## 2024-05-08 RX ADMIN — PHENYLEPHRINE HCL 200 MCG: 10 MG/ML VIAL (ML) INJECTION at 17:29:00

## 2024-05-08 RX ADMIN — PHENYLEPHRINE HCL 100 MCG: 10 MG/ML VIAL (ML) INJECTION at 14:45:00

## 2024-05-08 RX ADMIN — PHENYLEPHRINE HCL 200 MCG: 10 MG/ML VIAL (ML) INJECTION at 16:33:00

## 2024-05-08 RX ADMIN — MIDAZOLAM HYDROCHLORIDE 2 MG: 1 INJECTION INTRAMUSCULAR; INTRAVENOUS at 14:06:00

## 2024-05-08 RX ADMIN — SODIUM CHLORIDE, SODIUM LACTATE, POTASSIUM CHLORIDE, CALCIUM CHLORIDE: 600; 310; 30; 20 INJECTION, SOLUTION INTRAVENOUS at 14:06:00

## 2024-05-08 RX ADMIN — LIDOCAINE HYDROCHLORIDE ANHYDROUS AND DEXTROSE MONOHYDRATE 1 MG/MIN: .8; 5 INJECTION, SOLUTION INTRAVENOUS at 14:57:00

## 2024-05-08 RX ADMIN — SODIUM CHLORIDE: 9 INJECTION, SOLUTION INTRAVENOUS at 17:35:00

## 2024-05-08 RX ADMIN — EPHEDRINE SULFATE 10 MG: 50 INJECTION, SOLUTION INTRAVENOUS at 16:33:00

## 2024-05-08 RX ADMIN — EPHEDRINE SULFATE 5 MG: 50 INJECTION, SOLUTION INTRAVENOUS at 14:45:00

## 2024-05-08 RX ADMIN — CEFAZOLIN SODIUM/WATER 2 G: 2 G/20 ML SYRINGE (ML) INTRAVENOUS at 14:06:00

## 2024-05-08 NOTE — ANESTHESIA POSTPROCEDURE EVALUATION
Patient: Dulce Maria Frausto    Procedure Summary       Date: 05/08/24 Room / Location: [facility] MAIN OR 04 / [facility] MAIN OR    Anesthesia Start: 1406 Anesthesia Stop: 1826    Procedures:       Thoracic 11 - Lumbar 1 fusion with instrumentation, left Thoracic 12 - Lumbar 1 discectomy, Thoracic 11 - 12 Smith - Chino osteotomy (Spine Thoracic)      Thoracic laminectomy 2 level (Spine Thoracic) Diagnosis: (Herniated nucleus pulposus thoracic 12 - lumbar 1, retained hardware kyphosis)    Surgeons: Luther Andrade MD Anesthesiologist: Kandice Ontiveros MD    Anesthesia Type: general ASA Status: 3            Anesthesia Type: general    Vitals Value Taken Time   /97 05/08/24 1825   Temp 97.6 °F (36.4 °C) 05/08/24 1825   Pulse 93 05/08/24 1826   Resp 14 05/08/24 1826   SpO2 96 % 05/08/24 1826   Vitals shown include unfiled device data.    [facility] AN Post Evaluation:   Patient Evaluated in PACU  Patient Participation: complete - patient participated  Level of Consciousness: awake and alert  Pain Score: 0  Pain Management: adequate  Airway Patency:patent  Yes    Nausea/Vomiting: none  Cardiovascular Status: acceptable  Respiratory Status: acceptable  Postoperative Hydration acceptable      KANDICE ONTIVEROS MD  5/8/2024 6:26 PM

## 2024-05-08 NOTE — OPERATIVE REPORT
Pre-postop dx:  HNP T12-L1, kyphosis  Proc:  left T12 costotransversectomy and pedicle removal, removal of HNP T12-L1, Ramirez gooden osteotomy T11-2, PSF T10-L1, Spinecraft inst. T10-L3, local graft,intrathecal block  Robbins/Galdino  GETA  Ebl: 300 ml none given back through CS  Drains: hv x 2 in back  Complications: none  To RR stable  #[id number]

## 2024-05-08 NOTE — ANESTHESIA PROCEDURE NOTES
Arterial Line    Date/Time: 5/8/2024 1:37 PM    Performed by: Mayte Duncan CRNA  Authorized by: Augusto Bryant MD    General Information and Staff    Procedure Start:  5/8/2024 1:37 PM  Procedure End:  5/8/2024 1:44 PM  Anesthesiologist:  Augusto Bryant MD  Performed By:  Anesthesiologist  Patient Location:  OR  Indication: continuous blood pressure monitoring and unable to use non-invasive cuff    Site Identification: real time ultrasound guided, static ultrasound guided and image stored and retrievable    Preanesthetic Checklist: 2 patient identifiers, IV checked, risks and benefits discussed, monitors and equipment checked, pre-op evaluation, timeout performed, anesthesia consent and sterile technique used    Procedure Details    Catheter Size:  20 G  Catheter Type:  Arrow  Laterality:  Left  Site:  Radial artery    Assessment    Events: patient tolerated procedure well with no complications      Medications  5/8/2024 1:37 PM      Additional Comments

## 2024-05-08 NOTE — OPERATIVE REPORT
[facility]    PATIENT'S NAME: MARY FUENTES   ATTENDING PHYSICIAN: Luther Andrade MD   OPERATING PHYSICIAN: Luther Andrade MD   PATIENT ACCOUNT#:   [id number]    LOCATION:  [facility] PACU [address] [address]  MEDICAL RECORD #:   [id number]       YOB: 1960  ADMISSION DATE:       05/08/2024      OPERATION DATE:  05/08/2024    OPERATIVE REPORT      PREOPERATIVE DIAGNOSIS:  T12-L1 herniated disc with spinal cord compression and kyphosis T11-12.    POSTOPERATIVE DIAGNOSIS:  T12-L1 herniated disc with spinal cord compression and kyphosis T11-12.    PROCEDURE:    1.   Left T12 pedicle removal and costotransversectomy (21610).  2.   Ramirez-Santamaria osteotomy, T11-12 (22212).  3.   Posterolateral fusion, T10-11, T11-12, T12-L1 level (93222, 22614 x2).  4.   SpineCraft pedicle screw instrumentation, T10 to L3 (52740).  5.   Local bone graft.  6.   Intrathecal morphine and fentanyl block for postoperative analgesia.    ASSISTANT:  Arleen Ahmadi PA-C (medically necessary due to high-level complexity of medical procedure).    ANESTHESIA:  General endotracheal.    ESTIMATED BLOOD LOSS:  300 mL.    DRAINS:  None.      COMPLICATIONS:  None.    INDICATIONS:  This 64-year-old female has had multiple operations to the lumbar spine.  She had an L1 to S1 fusion, then developed a large disc herniation at T12-L1 with severe cord compression.  She also has some kyphosis at T11-12.  I have recommended discectomy through pedicle removal and costotransversectomy/laminectomy and correction of the kyphosis.  Fusion will be added.  Risks, benefits, and alternatives were explained to the patient in detail.  She appears to understand and has elected to proceed.  She understands there is risk of paraplegia with this procedure as the herniation is markedly compressing her thoracic cord; however, there are no other good options.    OPERATIVE TECHNIQUE:  The patient was given uncomplicated general endotracheal anesthesia and  placed prone on the [facility] frame.  The face and extremities were well padded.  The back was prepped and draped in usual fashion.  She was given preoperative antibiotics and IV steroids.    Localizing x-ray was taken.  A 10-inch incision was made over the upper aspect of the previous lumbar incision, carried down through fascia, and the fascia was reflected up to the transverse processes of T10, T11, T12, and the upper aspect of the instrumentation.  The [place] screws (set screws) were removed at L1 L2, L3, and then the rods bent up and then the screws at L1 removed.    We then placed a pedicle probe in the right T12 pedicle and intraoperative x-ray was taken to confirm position of the dissection and the overall length of the ensuing screws.  Pedicle holes were then placed at T10, T11, and T12 bilaterally.  The soft tissues were completely stripped off the posterolateral elements from T10 to L1.    We then thinned out the lamina on the left side only and undercut the spinous process so that we could leave a bridge of the spinous process to protect the ensuing bone graft on the right.  We then did a laminectomy at T12-L1, removing the ligamentum flavum which was slightly thickened, especially on the left side.  We were able to visualize the left L1 nerve root.  We then did a complete facetectomy on the left and followed the lateral aspect of the dura up to the T12 pedicle.  As there was no disc fragment visible at this point, it became obvious we needed to do a full costotransversectomy and we resected the T12 pedicle on the left.  This allowed a micro nerve hook to work under the dura.  Two large fragments of disc material were worked free.  These appeared to be the offending fragments.  These were sitting just at the level of the T12 pedicle and also slightly cephalad to the T12-L1 disc space.  We did an exhaustive search with the micro nerve hook and found no other fragments.  We were exceedingly careful not to  displace the dura or the cord.  At this point, the left L1 and T12 roots appeared to be free and clear.    SpineCraft [place] pedicle screws were then placed at T10-T11.  We then did a Ramirez-Santamaria osteotomy, formal type, at T11-12 to correct the kyphosis.  We then were able to place a right T12 pedicle screw.  We then stimulated all the pedicle screws and all were within threshold limits.  We then did a good decortication of the lamina and spinous processes bilaterally from T10 to L1 on the right and T10 to T12 on the left.  Rods were then connected to the previous rods which we then bent down, using alexey connectors with an inline connector on the left and a quad connector on the right side.  Care was taken to avoid overcorrection.  New [place] screws were placed in the L2 and L3 pedicle screws and all set screws tightened down from T10 to L3.  Final tightening performed on the set screws and final tightening performed on the alexey connectors.  Fusion was then performed with a large Infuse kit from T10 to L1 on the right and T10 to T12 on the left, along with allograft and autograft and putty.  Nerve roots were free and clear at the end of the procedure.  Final x-ray showed excellent placement of the hardware.  Vancomycin powder 1 g placed in the depths of the wound.  Two deep Hemovac drains were placed.  Deep fascia closed with #1 Vicryl suture, subcutaneous tissue closed with 2-0 looped PDS, skin closed with 4-0 Monocryl and Dermabond.  A sterile, dry dressing was applied.  The patient tolerated the procedure well, was extubated, and taken to recovery room with stable blood pressure and pulse.  There were no apparent complications.    Dictated By Luther Andrade MD  d: 05/08/2024 18:27:38  t: 05/08/2024 18:37:54  Job [id number]  [email or web address]/

## 2024-05-08 NOTE — DISCHARGE INSTRUCTIONS
Can resume enalapril 20 mg if systolic blood pressure greater than 120.  If after resuming enlapril the systolic blood pressure is still over 120 then can resume amlodipine 5mg.  If any blood pressures over 140 can resume both.  If any blood pressures under 100 hold any blood pressure medications and then resume first with enalapril when systolic blood pressure over 120.      Sometimes managing your health at home requires assistance.  The [facility] team has recognized your preference to use [facility].  They can be reached by phone at [phone].  The fax number for your reference is [phone].  A representative from the home health agency will contact you or your family to schedule your first visit.      Dr. Andrade - Thoracic/LUMBAR SPINE SURGERY     Incision:  Ok to get wet in a shower and gently wash with soap and water two days after surgery.  Change your dressing daily.  When there is no drainage for two days in a row, you may discontinue the dressing.  Walk as much as possible after surgery.  No bending, lifting more than 10#, or twisting for 12 weeks.  Wear your back brace when out of bed.   Take vitamin C 1000 mg twice daily with food for 4 weeks after surgery.  Take calcitonin spray once daily for 60 days after surgery.  Take vitamin D 125 mcg once daily for 60 days after surgery.  Take pain medication (Norco) as needed for pain, wean down as you are able.  Take tizanidine (Zanaflex) for muscle spasms/pain as needed.  Use miralax and colace (over the counter medications) to prevent constipation associated with the narcotic pain meds.  Call Dr. Andrade's office with any concerns:  [phone].

## 2024-05-08 NOTE — ANESTHESIA PROCEDURE NOTES
Airway  Date/Time: 5/8/2024 2:15 PM  Urgency: Elective      General Information and Staff    Patient location during procedure: OR  Anesthesiologist: Augusto Bryant MD  Resident/CRNA: Mayte Duncan CRNA  Performed: CRNA   Performed by: Mayte Duncan CRNA  Authorized by: Augusto Bryant MD      Indications and Patient Condition  Indications for airway management: anesthesia  Sedation level: deep  Preoxygenated: yes  Patient position: sniffing  Mask difficulty assessment: 2 - vent by mask + OA or adjuvant +/- NMBA    Final Airway Details  Final airway type: endotracheal airway      Successful airway: ETT  Cuffed: yes   Successful intubation technique: Video laryngoscopy  Endotracheal tube insertion site: oral  Blade: GlideScope  Blade size: #3  ETT size (mm): 7.0    Cormack-Lehane Classification: grade I - full view of glottis  Placement verified by: capnometry   Measured from: lips  ETT to lips (cm): 21  Number of attempts at approach: 1    Additional Comments  Dental exam unchanged from pre op  Soft gauze bite block between molars

## 2024-05-08 NOTE — H&P
HISTORY OF PRESENT ILLNESS: Dulce Maria returns today. Her pain is improved just slightly, but she is still having to take pain medication every 6 hours. She has not noticed any weakness or numbness at all. It is just left lower back pain that radiates into the left hip.    She is walking with a walking stick, but she really does not need it. She just uses it for overall comfort.    PHYSICAL EXAMINATION: She has some mild pain in the left lower back with range of motion of the lumbar spine. She has normal hip flexor strength bilaterally, 5/5 except on testing left hip flexor strength there is some moderate pain, so her strength may be just slightly less than that, really more like 4+ to 5-/5 on the left hip flexor. Hip abductors are 5/5. Distally, she still has some slight left foot dorsiflexor weakness at approximately 4+/5 and decreased sensation, left L5 distribution, which she has had for several years. Straight leg raising is negative. No pain with hip range of motion, either side.    IMAGING: MRI shows a massive disk herniation T12-L1 that has migrated cephalad up to the left T12 pedicle. Solid fusion L1 to pelvis.    ASSESSMENT AND PLAN: We went over the MRI and I showed her that the herniation is in front of the cord and that the tail of it is just outside the dura and should be accessible from a posterior approach rather than having to do a transthoracic approach. I do think there is a chance we may have to take down the T12 pedicle on the left. We will then take the instrumentation up to T11, possibly T10 if we need to. She understands the fusion will be done with allograft, local bone graft, Infuse.    ASSESSMENT: T12-L1 disk herniation. I explained the operation in layman's terms. I did tell her that there is a chance of paralysis with this operation, but there really is no other good options. The fragment is very large and I am concerned about further neurologic deficit if it enlarges in  size.    Neurologically today she had trace reflexes at the knees and the ankles. Negative straight leg raise maneuver. Downgoing toes. No clonus, so there is really no signs of hyperreflexia or cord dysfunction. All questions answered. She understands she will be in the hospital approximately 3 days. She will wear a TLSO postoperative. All questions answered.       Revised Cardiac Risk Index (modified Cespedes Index):    High-risk surgical procedure (intraperitoneal, intrathoracic, suprainguinal vascular) n    History of MI or positive stress test, current chest pain secondary to myocardiac ischemia, current nitrate therapy, or EKG with pathologic Q wave: n    History of CHF, pulmonary edema, PND, current rales, S3 gallop, chest xray with pulmonary vascular redistribution: n    History of CVA/TIA: n    Preoperative treatment with insulin: n    Preoperative creatinine > 2.0: n    Score: 0    Risk of major cardiac complication: <1%    Current Outpatient Medications   Medication Sig Dispense Refill   omeprazole 20 MG Oral Capsule Delayed Release Take 1 capsule (20 mg total) by mouth daily. 90 capsule 3   ALPRAZolam 0.25 MG Oral Tab take one tablet tid prn for anxiety 30 tablet 0   Omega-3 Fatty Acids (FISH OIL) 300 MG Oral Cap Take by mouth.   Cholecalciferol 125 MCG (5000 UT) Oral Tab Take 1 tablet (5,000 Units total) by mouth daily. 60 tablet 0   Ascorbic Acid (VITAMIN C) 1000 MG Oral Tab Take 1 tablet (1,000 mg total) by mouth 2 (two) times daily. 60 tablet 0   calcitonin, salmon, 200 UNIT/ACT Nasal Solution 1 spray by Nasal route daily. Alternate sides 3.7 mL 1   DULoxetine 30 MG Oral Cap DR Particles Take 1 capsule (30 mg total) by mouth 3 (three) times daily. 270 capsule 0   HYDROcodone-acetaminophen (NORCO) 7.5-325 MG Oral Tab Take 0.5-1 tablets by mouth every 6 (six) hours as needed for Pain. 40 tablet 0   leflunomide 20 MG Oral Tab Take 1 tablet (20 mg total) by mouth daily. 90 tablet 0   amLODIPine 5 MG Oral  Tab TAKE 1 TABLET (5 MG TOTAL) BY MOUTH DAILY. 90 tablet 0   predniSONE 5 MG Oral Tab 4 tabs PO QD for 7 days, then 3 tabs PO QD for 7 days, then 2 tabs PO every day x 7 days, then 1 1/2 tabs (7.5mg) every day as maintenance 198 tablet 1   Golimumab (SIMPONI) 50 MG/0.5ML Subcutaneous Solution Auto-injector Inject 50 mg into the skin every 30 (thirty) days. Took and failed leflunomide, methotrexate, humira, enbrel, rituxan, rinvoq, Xeljanz, orencia, actemra, cimzia and plaquenil. 3 each 3   pregabalin 200 MG Oral Cap Take 1 capsule (200 mg total) by mouth 2 (two) times daily. 60 capsule 5   enalapril 20 MG Oral Tab TAKE 1 TABLET BY MOUTH EVERY DAY 90 tablet 4   Blood Pressure Monitor Does not apply Device Check blood pressure daily and as needed 1 Device 0   B Complex-C-Folic Acid (HM VITAMIN B COMPLEX/VITAMIN C) Oral Tab Take by mouth daily.   TIZANIDINE 2 MG Oral Tab TAKE 1-2 TABLETS BY MOUTH EVERY 6 HOURS AS NEEDED FOR SPASM (Patient not taking: Reported on 2/12/2024) 60 tablet 0     Allergies: No Known Allergies   Past Medical History:   Diagnosis Date   ANXIETY   Anxiety   Back problem   C. difficile diarrhea   Cataract   Cervical spondylitis with radiculitis (HCC) 03/26/2021   Collagenous colitis 10/20/2023   No NSAIDs Budesonide taper Follows with GI   CTS (carpal tunnel syndrome)   DEPRESSION   Depression   Empty sella (HCC) - noted on CT 05/18/2017   Esophageal reflux   Essential hypertension   Exocrine pancreatic insufficiency 10/20/2023   Fibromyalgia   High blood pressure   History of back surgery - 10/2019 10/17/2019   HYPERTENSION   Incontinence   bladder   Loss of consciousness (HCC) 04/18/2017   Mild aortic sclerosis 05/12/2017   Myofascial muscle pain   Nonrheumatic mitral valve regurgitation 1/31/2024   BELKIS on CPAP   OSTEOARTHRITIS   thumb   Osteoarthritis   OTHER DISEASES   cts bilat   OTHER DISEASES   bilat lat epicondylitis   OTHER DISEASES   vit d deficiency   Rheumatoid arthritis (HCC)    SEASONAL ALLERGIES   Shingles   Spinal stenosis   Spinal stenosis of lumbar region with neurogenic claudication - s/p laminectomy and decompression 10/2019 2019   Trigeminal neuralgia 2013   [place]pecified sleep apnea [facility] [place]   AHI 32/ RDI 32/ REM 26/ Supine 34/ non-supine 2.6/jenelle 86 ; CPAP use   Visual impairment   eyeglasses     Past Surgical History:   Procedure Laterality Date   BACK SURGERY   CARPAL TUNNEL RELEASE   COLONOSCOPY   COLONOSCOPY   COLONOSCOPY N/A 2021   Procedure: COLONOSCOPY, with bxs; Surgeon: Felipa Mckeon MD; [place] [facility]   COLONOSCOPY,DIAGNOSTIC 2007   Dr. Cardoso, diverticulosis   OTHER SURGICAL HISTORY  &    carpal tunnel (rt)   UPPER GI ENDOSCOPY,DIAGNOSIS 2007   Dr. Cardoso, gastric bx negative   UPPER GI ENDOSCOPY,EXAM     Family History   Problem Relation Age of Onset   Hypertension Mother   Heart Disorder Mother   s/p thoracic aortic aneurysm repair.   Other (Other) Mother   Stroke Mother   Colon Cancer Mother   rectal   Heart Disorder Father   chf   Other (Other) Father   alzheimers   Other (Other) Brother   thyroid issues   Psychiatric Daughter   depression, ADD   Thyroid Disorder Other   mat aunts and uncles     Social History:   Social History  Tobacco Use  Smoking status: Former  Packs/day: 1.00  Years: 17.00  Additional pack years: 0.00  Total pack years: 17.00  Types: Cigarettes  Quit date: 1998  Years since quittin.8  Smokeless tobacco: Never  Vaping Use  Vaping Use: Some days  Alcohol use: Yes  Alcohol/week: 2.5 standard drinks of alcohol  Types: 3 Standard drinks or equivalent per week  Comment: glass of wine daily; instructed to refrain from alcohol 24 hrs prior to surgery  Drug use: Yes  Types: Cannabis  Comment: smokes daily for pain management; instructed to refrain from marijuana use 24 hrs prior to surgery        REVIEW OF SYSTEMS:   GENERAL: feels well otherwise  SKIN: denies any unusual skin  lesions  EYES:denies blurred vision or double vision  HEENT: denies nasal congestion, sinus pain or ST  LUNGS: denies shortness of breath with exertion  CARDIOVASCULAR: denies chest pain on exertion  GI: denies abdominal pain,denies heartburn  MUSCULOSKELETAL: + back pain  NEURO: denies headaches  HEMATOLOGIC: denies hx of anemia  ENDOCRINE: denies thyroid history  ALL/ASTHMA: denies hx of allergy or asthma    EXAM:   /82  Pulse 78  Ht 5' 3\" (1.6 m)  Wt 172 lb (78 kg)  LMP 01/25/2011  BMI 30.47 kg/m²   GENERAL: well developed, well nourished,in no apparent distress  SKIN: no rashes,no suspicious lesions  HEENT: atraumatic, normocephalic,ears and throat are clear  EYES:PERRLA, EOMI,conjunctiva are clear  NECK: supple,no adenopathy,no bruits  CHEST: no chest tenderness  LUNGS: clear to auscultation  CARDIO: RRR without murmur  GI: good BS's,no masses, HSM or tenderness  MUSCULOSKELETAL: back is tender,FROM of the back  EXTREMITIES: no cyanosis, clubbing or edema    EKG NSR, no st/t wave changes    ASSESSMENT AND PLAN:   Dulce Maria Vines is a 64 year old female who presents for a pre-operative physical exam. Patient is to have T11-L1 fusion with instrumentation, T12-L1 discectomy, T11-12 Ramirez [place] osteotomy with Dr. Luther Andrade on 5/8/24 at [facility] . Pt has the following conditions: none. Pt has no significant history of cardiac or pulmonary conditions. Pt is at low risk for planned procedure pending imaging and labs.    1. Thoracic disc herniation  Planned procedure per Dr. Andrade    2. Encounter for pre-operative examination  - CBC WITH DIFFERENTIAL WITH PLATELET; Future  - COMPREHENSIVE METABOLIC PANEL; Future  - URINALYSIS, COMPLETE WITH MICROSCOPIC EXAMINATION WITH REFLEX TO URINE CULTURE, ROUTINE; Future  - ELECTROCARDIOGRAM, COMPLETE  - XR CHEST PA + LAT CHEST (CPT=71046); Future  - MRSA  - PT and PTT

## 2024-05-08 NOTE — ANESTHESIA PROCEDURE NOTES
Peripheral IV  Date/Time: 5/8/2024 2:37 PM  Inserted by: Mayte Duncan CRNA    Placement  Needle size: 18 G  Laterality: right  Location: hand  Site prep: alcohol  Technique: anatomical landmarks  Attempts: 1

## 2024-05-08 NOTE — ANESTHESIA PREPROCEDURE EVALUATION
Anesthesia PreOp Note    HPI:     Dulce Maria Frausto is a 64 year old female who presents for preoperative consultation requested by: Luther Andrade MD    Date of Surgery: 5/8/2024    Procedure(s):  Thoracic 11 - Lumbar 1 fusion with instrumentation, left Thoracic 12 - Lumbar 1 discectomy, Thoracic 11 - 12 Smith - Chino osteotomy  Thoracic laminectomy 2 level  Indication: Herniated nucleus pulposus thoracic 12 - lumbar 1, retained hardware kyphosis    Relevant Problems   No relevant active problems       NPO:  Last Liquid Consumption Date: 05/08/24  Last Liquid Consumption Time: 0900 (sips of water with meds)  Last Solid Consumption Date: 05/07/24  Last Solid Consumption Time: 2000  Last Liquid Consumption Date: 05/08/24          History Review:  Patient Active Problem List    Diagnosis Date Noted    Cervical spondylitis with radiculitis (HCC) 03/26/2021    s/p left L1-2 XLIF, left L3-L4 MIS TLIF; 12/21/2021 03/26/2021    L-S radiculopathy 02/22/2021    History of back surgery - 10/2019 10/17/2019    Spinal stenosis of lumbar region with neurogenic claudication - s/p laminectomy and decompression 10/2019 08/14/2019    Arthritis of midfoot 08/22/2018    Seronegative rheumatoid arthritis (HCC) 01/03/2018    Empty sella (HCC) - noted on CT, normal w/u per endo, hormone levels nl 05/18/2017    Mild aortic sclerosis 05/12/2017    Degeneration of cervical intervertebral disc 07/12/2016    BELKIS (obstructive sleep apnea) 11/14/2012    Depression 05/09/2011    Anxiety 05/09/2011    Fibromyalgia 12/28/2010    Chronic pain 12/28/2010    Essential hypertension, benign 09/08/2006       Past Medical History:    ANXIETY    Anxiety    Back problem    Cataract    Colitis    CTS (carpal tunnel syndrome)    DEPRESSION    Depression    Empty sella (HCC) - noted on CT    Esophageal reflux    Essential hypertension    Fibromyalgia    High blood pressure    History of back surgery - 10/2019    Hx of motion sickness    HYPERTENSION     Incontinence    bladder    Loss of consciousness (HCC)    Mild aortic sclerosis    Myofascial muscle pain    BELKIS on CPAP    OSTEOARTHRITIS    thumb    Osteoarthritis    OTHER DISEASES    cts bilat    OTHER DISEASES    bilat lat epicondylitis    OTHER DISEASES    vit d deficiency    Rheumatoid arthritis (HCC)    SEASONAL ALLERGIES    Shingles    Spinal stenosis    Spinal stenosis of lumbar region with neurogenic claudication - s/p laminectomy and decompression 10/2019    Trigeminal neuralgia    Unspecified sleep apnea    AHI 32/ RDI 32/ REM 26/ Supine 34/ non-supine 2.6/jenelle 86 ; CPAP use    Visual impairment    eyeglasses       Past Surgical History:   Procedure Laterality Date    Back surgery      Carpal tunnel release      Colonoscopy      Colonoscopy      Colonoscopy N/A 07/09/2021    Procedure: COLONOSCOPY, with bxs;  Surgeon: Felipa Mckeon MD;  Location: [facility] [place] [facility]    Colonoscopy,diagnostic  02/2007    Dr. Cardoso, diverticulosis    Other surgical history  2002 & 2007    carpal tunnel (rt)    Spinal fusion  07/22/2022    lumbar    Spine surgery procedure unlisted      lumbar fusion    Upper gi endoscopy,diagnosis  02/2007    Dr. Cardoso, gastric bx negative    Upper gi endoscopy,exam         Medications Prior to Admission   Medication Sig Dispense Refill Last Dose    Golimumab (SIMPONI) 50 MG/0.5ML Subcutaneous Solution Auto-injector Inject 50 mg into the skin every 30 (thirty) days.   Past Month    HYDROcodone-acetaminophen  MG Oral Tab Take 1 tablet by mouth every 6 (six) hours as needed.   5/8/2024 at 0900    Omega-3 Fatty Acids (FISH OIL) 300 MG Oral Cap Take 1 capsule by mouth daily.   Past Week    predniSONE 5 MG Oral Tab Take 1.5 tablets (7.5 mg total) by mouth daily.   5/8/2024 at 0700    leflunomide 20 MG Oral Tab Take 1 tablet (20 mg total) by mouth daily. 90 tablet 0 5/7/2024 at 0700    AMLODIPINE 5 MG Oral Tab TAKE 1 TABLET BY MOUTH EVERY DAY 90 tablet 1 5/8/2024 at 0700     DULOXETINE 30 MG Oral Cap DR Particles TAKE 1 CAPSULE BY MOUTH THREE TIMES A DAY (Patient taking differently: Take 1 capsule (30 mg total) by mouth in the morning, at noon, and at bedtime.) 270 capsule 0 5/7/2024 at 1900    ENALAPRIL 20 MG Oral Tab TAKE 1 TABLET BY MOUTH EVERY DAY 90 tablet 1 5/7/2024 at 0700    PREGABALIN 200 MG Oral Cap TAKE 1 CAPSULE BY MOUTH TWICE A  capsule 0 5/8/2024 at 0700    omeprazole 20 MG Oral Capsule Delayed Release Take 1 capsule (20 mg total) by mouth daily. 90 capsule 3 5/8/2024 at 0700    ALPRAZolam 0.25 MG Oral Tab take one tablet tid prn for anxiety 30 tablet 0 Past Week    Multiple Vitamins-Minerals (ONE DAILY WOMENS 50+) Oral Tab Take  by mouth.   Past Week    polyethylene glycol, PEG 3350, 17 g Oral Powd Pack Take 17 g by mouth daily as needed.  0 More than a month    Blood Pressure Monitor Does not apply Device Check blood pressure daily and as needed 1 Device 0      Current Facility-Administered Medications Ordered in Epic   Medication Dose Route Frequency Provider Last Rate Last Admin    lactated ringers infusion   Intravenous Continuous Luther Andrade MD 20 mL/hr at 05/08/24 1207 New Bag at 05/08/24 1207    famotidine (Pepcid) tab 20 mg  20 mg Oral Once Luther Andrade MD        Or    famotidine (Pepcid) 20 mg/2mL injection 20 mg  20 mg Intravenous Once Luther Andrade MD        oxyCODONE immediate release tab 10 mg  10 mg Oral Once Luther Andrade MD        ceFAZolin (Ancef) 2 g in 20mL IV syringe premix  2 g Intravenous Once Luther Andrade MD         No current [facility]-ordered outpatient medications on file.       No Known Allergies    Family History   Problem Relation Age of Onset    Heart Disorder Father         chf    Other (Other) Father         alzheimers    Hypertension Mother     Heart Disorder Mother         s/p thoracic aortic aneurysm repair.    Other (Other) Mother         AAA    Psychiatric Daughter         depression, ADD    Other (Other) Brother          thyroid issues    Thyroid Disorder Other         mat aunts and uncles     Social History     Socioeconomic History    Marital status:    Tobacco Use    Smoking status: Former     Current packs/day: 0.00     Types: Cigarettes     Start date: 1981     Quit date: 1998     Years since quittin.8    Smokeless tobacco: Never   Vaping Use    Vaping status: Some Days   Substance and Sexual Activity    Alcohol use: Yes     Alcohol/week: 2.5 standard drinks of alcohol     Types: 3 Standard drinks or equivalent per week     Comment: glass of wine daily; instructed to refrain from alcohol 24 hrs prior to surgery    Drug use: Yes     Types: Cannabis     Comment: smokes daily for pain management; instructed to refrain from  marijuana use 24 hrs prior to surgery    Sexual activity: Yes     Partners: Male       Available pre-op labs reviewed.             Vital Signs:  Body mass index is 30.17 kg/m².   height is 1.6 m (5' 3\") and weight is 77.2 kg (170 lb 4.8 oz). Her oral temperature is 97.9 °F (36.6 °C). Her blood pressure is 123/73 and her pulse is 73. Her respiration is 18 and oxygen saturation is 96%.   Vitals:    24 0915 24 1141   BP:  123/73   Pulse:  73   Resp:  18   Temp:  97.9 °F (36.6 °C)   TempSrc:  Oral   SpO2:  96%   Weight: 78.5 kg (173 lb) 77.2 kg (170 lb 4.8 oz)   Height: 1.6 m (5' 3\") 1.6 m (5' 3\")        Anesthesia Evaluation     Patient summary reviewed and Nursing notes reviewed    Airway   Mallampati: II  Dental - Dentition appears grossly intact     Pulmonary     breath sounds clear to auscultation  (+) sleep apnea on CPAP  Cardiovascular   Exercise tolerance: good  (+) hypertension well controlled    ECG reviewed  Rhythm: regular  Rate: normal    Neuro/Psych    (+)  neuromuscular disease, anxiety/panic attacks,  depression      GI/Hepatic/Renal    (+) GERD well controlled    Endo/Other    (+) arthritis  Abdominal                  Anesthesia Plan:   ASA:  3  Plan:    General  Airway:  ETT  Post-op Pain Management: IV analgesics  Informed Consent Plan and Risks Discussed With:  Patient and spouse      I have informed Dulce Maria Frausto and/or legal guardian or family member of the nature of the anesthetic plan, benefits, risks including possible dental damage if relevant, major complications, and any alternative forms of anesthetic management.   All of the patient's questions were answered to the best of my ability. The patient desires the anesthetic management as planned.  LILLIAN VOGEL MD  5/8/2024 1:13 PM  Present on Admission:  **None**

## 2024-05-09 LAB
HCT VFR BLD AUTO: 32.1 %
HGB BLD-MCNC: 9.7 G/DL

## 2024-05-09 PROCEDURE — 97530 THERAPEUTIC ACTIVITIES: CPT

## 2024-05-09 PROCEDURE — 97116 GAIT TRAINING THERAPY: CPT

## 2024-05-09 PROCEDURE — 97165 OT EVAL LOW COMPLEX 30 MIN: CPT

## 2024-05-09 PROCEDURE — 85014 HEMATOCRIT: CPT | Performed by: ORTHOPAEDIC SURGERY

## 2024-05-09 PROCEDURE — 85018 HEMOGLOBIN: CPT | Performed by: ORTHOPAEDIC SURGERY

## 2024-05-09 PROCEDURE — 97162 PT EVAL MOD COMPLEX 30 MIN: CPT

## 2024-05-09 RX ORDER — AMLODIPINE BESYLATE 5 MG/1
5 TABLET ORAL DAILY
Status: DISCONTINUED | OUTPATIENT
Start: 2024-05-09 | End: 2024-05-11

## 2024-05-09 RX ORDER — PANTOPRAZOLE SODIUM 20 MG/1
20 TABLET, DELAYED RELEASE ORAL
Status: DISCONTINUED | OUTPATIENT
Start: 2024-05-09 | End: 2024-05-11

## 2024-05-09 RX ORDER — OXYCODONE HYDROCHLORIDE 5 MG/1
10 TABLET ORAL EVERY 4 HOURS PRN
Status: DISCONTINUED | OUTPATIENT
Start: 2024-05-09 | End: 2024-05-11

## 2024-05-09 RX ORDER — OXYCODONE HYDROCHLORIDE 5 MG/1
20 TABLET ORAL EVERY 4 HOURS PRN
Status: DISCONTINUED | OUTPATIENT
Start: 2024-05-09 | End: 2024-05-11

## 2024-05-09 RX ORDER — DULOXETIN HYDROCHLORIDE 60 MG/1
60 CAPSULE, DELAYED RELEASE ORAL NIGHTLY
Status: DISCONTINUED | OUTPATIENT
Start: 2024-05-09 | End: 2024-05-11

## 2024-05-09 RX ORDER — HYDROMORPHONE HYDROCHLORIDE 1 MG/ML
0.8 INJECTION, SOLUTION INTRAMUSCULAR; INTRAVENOUS; SUBCUTANEOUS EVERY 2 HOUR PRN
Status: DISCONTINUED | OUTPATIENT
Start: 2024-05-09 | End: 2024-05-11

## 2024-05-09 NOTE — PHYSICAL THERAPY NOTE
PHYSICAL THERAPY EVALUATION - INPATIENT     Room Number: 411/411-A  Evaluation Date: 5/9/2024  Type of Evaluation: Initial   Physician Order: PT Eval and Treat    Presenting Problem: S/P T11-L1 fusion, T12-L1 disectomy, T11-12 osteotomy 5/8/24  Co-Morbidities : HTN, OA, spinal stenosis, anxiety, depression, BELKIS, hx L2-3 & L5-S1 fusion & hardware replacement L1-S1 7/2022  Reason for Therapy: Mobility Dysfunction and Discharge Planning    PHYSICAL THERAPY ASSESSMENT   Patient is a 64 year old female admitted 5/8/2024 S/P T11-L1 fusion, T12-L1 disectomy, T11-12 osteotomy 5/8/24.  Prior to admission, patient's baseline is independent no assistive device, occasionally uses walking stick for longer distances and on concrete.  Patient is currently functioning below baseline with bed mobility, transfers, gait, and stair negotiation.  Patient is requiring stand-by assist with a walker as a result of the following impairments: pain and limited spinal ROM.  Physical Therapy will continue to follow for duration of hospitalization.    Patient will benefit from continued skilled PT Services at discharge to promote functional independence in home.  Anticipate patient will return home with home health PT.    PLAN  PT Treatment Plan: Bed mobility;Body mechanics;Endurance;Energy conservation;Patient education;Gait training;Strengthening;Stair training;Transfer training  Rehab Potential : Good  Frequency (Obs): 5x/week    PHYSICAL THERAPY MEDICAL/SOCIAL HISTORY     Problem List  Active Problems:    S/P fusion of thoracic spine      HOME SITUATION  Home Situation  Type of Home: House  Home Layout: One level  Stairs to Enter : 3  Railing: Yes  Lives With: Spouse  Drives: Yes  Patient Owned Equipment: Rolling walker;Cane (walking sticks)  Patient Regularly Uses: Glasses     Prior Level of Walker: independent no assistive device, occasionally uses walking stick for longer distances and on concrete    SUBJECTIVE  \"This is my 5th back  surgery.\"    PHYSICAL THERAPY EXAMINATION   OBJECTIVE  Precautions: Spine;TLSO (TLSO when out of bed, not yet present but cleared to get up without, spinal precautions maintained)  Fall Risk: Standard fall risk    WEIGHT BEARING RESTRICTION  Weight Bearing Restriction: None                PAIN ASSESSMENT  Ratin  Location: back  Management Techniques: Body mechanics    COGNITION  Overall Cognitive Status:  WFL - within functional limits    RANGE OF MOTION AND STRENGTH ASSESSMENT  Upper extremity ROM and strength are within functional limits  BUEs within spinal precautions   Lower extremity ROM is within functional limits  BLEs within spinal precautions   Lower extremity strength is within functional limits  BLEs within spinal precautions     BALANCE  Static Sitting: Normal  Dynamic Sitting: Normal  Static Standing: Normal  Dynamic Standing: Good    NEUROLOGICAL FINDINGS           Sensation: light touch symmetrical          ACTIVITY TOLERANCE  Pulse: 60  Heart Rate Source: Monitor     BP: (!) 110/93  BP Location: Right arm  BP Method: Automatic  Patient Position: Sitting    O2 WALK  Oxygen Therapy  SPO2% on Room Air at Rest: 96    AM-PAC '6-Clicks' INPATIENT SHORT FORM - BASIC MOBILITY  How much difficulty does the patient currently have...  Patient Difficulty: Turning over in bed (including adjusting bedclothes, sheets and blankets)?: A Little   Patient Difficulty: Sitting down on and standing up from a chair with arms (e.g., wheelchair, bedside commode, etc.): A Little   Patient Difficulty: Moving from lying on back to sitting on the side of the bed?: A Little   How much help from another person does the patient currently need...   Help from Another: Moving to and from a bed to a chair (including a wheelchair)?: A Little   Help from Another: Need to walk in hospital room?: A Little   Help from Another: Climbing 3-5 steps with a railing?: A Little     AM-PAC Score:  Raw Score: 18   Approx Degree of Impairment:  46.58%   Standardized Score (AM-PAC Scale): 43.63   CMS Modifier (G-Code): CK    FUNCTIONAL ABILITY STATUS  Functional Mobility/Gait Assessment  Gait Assistance: Contact guard assist;Supervision  Distance (ft): 60'  Assistive Device: Rolling walker  Pattern: Within Functional Limits (slow-moderate pace, stiff posture, light BUE support through RW, no LOB)  Rolling: contact guard assist  Supine to Sit: contact guard assist  Sit to Stand: stand-by assist    ADDITIONAL INFORMATION    Pt tolerating household distances of gait, using rolling walker for pain management and posture. Pt able to verbalize and demonstrates understanding of spinal precautions.Patient received supine in bed, agreeable to physical therapy evaluation. Patient seen for evaluation in coordination with occupational therapy to maximize patient participation and function given pain levels and first time pt getting out of bed. Vital signs monitored as noted above, no adverse symptoms and patient stable during session. Education with patient provided verbally and via demonstration on physical therapy plan of care, physiological benefits of out of bed mobility, spinal precautions, and posture. Next session anticipate to progress gait and stair negotiation.    Patient history and/or personal factors that may impact the plan of care include home accessibility concerns, co-morbidities (HTN, OA, spinal stenosis, anxiety, depression, BELKIS) affecting medical status, endurance, pain levels, prior surgeries (L2-3 & L5-S1 fusion & hardware replacement L1-S1 7/2022), and longstanding history of pain. Based on the physical therapy examination of the noted systems and functional activity/participation limitations, the patient presentation is evolving given the patient presents with surgical precautions/limitations.      Exercise/Education Provided:  Bed mobility  Body mechanics  Energy conservation  Functional activity tolerated  Gait  training  Posture  Strengthening  Transfer training    The patient's Approx Degree of Impairment: 46.58% has been calculated based on documentation in the [facility] '6 clicks' Inpatient Basic Mobility Short Form.  Research supports that patients with this level of impairment may benefit from home-health PT.  Final disposition will be made by interdisciplinary medical team.    Patient End of Session: Up in chair;Needs met;Call light within reach;RN aware of session/findings;All patient questions and concerns addressed;Ice applied    CURRENT GOALS  Goals to be met by: 5/19/24  Patient Goal Patient's self-stated goal is: return home safely   Goal #1 Patient is able to demonstrate supine - sit EOB @ level: independent     Goal #1   Current Status    Goal #2 Patient is able to demonstrate transfers EOB to/from [facility] at assistance level: modified independent with walker - rolling     Goal #2  Current Status    Goal #3 Patient is able to ambulate 150 feet with assist device: walker - rolling at assistance level: modified independent   Goal #3   Current Status    Goal #4 Patient will negotiate 3 stairs/one curb w/ assistive device and supervision   Goal #4   Current Status    Goal #5 Patient to demonstrate independence with home activity/exercise instructions provided to patient in preparation for discharge.   Goal #5   Current Status    Goal #6    Goal #6  Current Status      Patient Evaluation Complexity Level:  History Moderate - 1 or 2 personal factors and/or co-morbidities   Examination of body systems Moderate - addressing a total of 3 or more elements   Clinical Presentation  Moderate - Evolving   Clinical Decision Making  Moderate Complexity     Gait Training: 15 minutes      Nora Boucher, PT, DPT  [facility]  Rehab Services - Physical Therapy  [id number]

## 2024-05-09 NOTE — H&P
[facility] Hospitalist H&P       CC: post operative medical management     PCP: Kiley Butler MD    History of Present Illness:   This is a 64-year-old female with a history of rheumatoid arthritis, fibromyalgia, osteoarthritis, hypertension, anxiety, depression, who presents to the hospital for T10-L1 fusion, discectomy left T12-L1. She is doing ok this AM. No severe pain. Her vital signs are stable. No BM yet. She does have pina intact. Hemovac drain with 110ml overnight.      Review of Systems  Comprehensive ROS reviewed and negative except for what's stated above.     PMH  Past Medical History:    ANXIETY    Anxiety    Back problem    Cataract    Colitis    CTS (carpal tunnel syndrome)    DEPRESSION    Depression    Empty sella (HCC) - noted on CT    Esophageal reflux    Essential hypertension    Fibromyalgia    High blood pressure    History of back surgery - 10/2019    Hx of motion sickness    HYPERTENSION    Incontinence    bladder    Loss of consciousness (HCC)    Mild aortic sclerosis    Myofascial muscle pain    BELKIS on CPAP    OSTEOARTHRITIS    thumb    Osteoarthritis    OTHER DISEASES    cts bilat    OTHER DISEASES    bilat lat epicondylitis    OTHER DISEASES    vit d deficiency    Rheumatoid arthritis (HCC)    SEASONAL ALLERGIES    Shingles    Spinal stenosis    Spinal stenosis of lumbar region with neurogenic claudication - s/p laminectomy and decompression 10/2019    Trigeminal neuralgia    Unspecified sleep apnea    AHI 32/ RDI 32/ REM 26/ Supine 34/ non-supine 2.6/jenelle 86 ; CPAP use    Visual impairment    eyeglasses        PSH  Past Surgical History:   Procedure Laterality Date    Back surgery      Carpal tunnel release      Colonoscopy      Colonoscopy      Colonoscopy N/A 07/09/2021    Procedure: COLONOSCOPY, with bxs;  Surgeon: Felipa Mckeon MD;  Location: [facility] SURG[facility]    Colonoscopy,diagnostic  02/2007    Dr. Cardoso, diverticulosis    Other surgical history  2002 & 2007     carpal tunnel (rt)    Spinal fusion  2022    lumbar    Spine surgery procedure unlisted      lumbar fusion    Upper gi endoscopy,diagnosis  2007    Dr. Cardoso, gastric bx negative    Upper gi endoscopy,exam          ALL:  No Known Allergies     Home Medications:  Outpatient Medications Marked as Taking for the 24 encounter (Hospital Encounter)   Medication Sig Dispense Refill    Golimumab (SIMPONI) 50 MG/0.5ML Subcutaneous Solution Auto-injector Inject 50 mg into the skin every 30 (thirty) days.      HYDROcodone-acetaminophen  MG Oral Tab Take 1 tablet by mouth every 6 (six) hours as needed.      Omega-3 Fatty Acids (FISH OIL) 300 MG Oral Cap Take 1 capsule by mouth daily.      predniSONE 5 MG Oral Tab Take 1.5 tablets (7.5 mg total) by mouth daily.      leflunomide 20 MG Oral Tab Take 1 tablet (20 mg total) by mouth daily. 90 tablet 0    AMLODIPINE 5 MG Oral Tab TAKE 1 TABLET BY MOUTH EVERY DAY 90 tablet 1    DULOXETINE 30 MG Oral Cap DR Particles TAKE 1 CAPSULE BY MOUTH THREE TIMES A DAY (Patient taking differently: Take 1 capsule (30 mg total) by mouth 2 (two) times daily.) 270 capsule 0    ENALAPRIL 20 MG Oral Tab TAKE 1 TABLET BY MOUTH EVERY DAY 90 tablet 1    PREGABALIN 200 MG Oral Cap TAKE 1 CAPSULE BY MOUTH TWICE A  capsule 0    omeprazole 20 MG Oral Capsule Delayed Release Take 1 capsule (20 mg total) by mouth daily. 90 capsule 3    ALPRAZolam 0.25 MG Oral Tab take one tablet tid prn for anxiety 30 tablet 0    Multiple Vitamins-Minerals (ONE DAILY WOMENS 50+) Oral Tab Take  by mouth.           Soc Hx  Social History     Tobacco Use    Smoking status: Former     Current packs/day: 0.00     Types: Cigarettes     Start date: 1981     Quit date: 1998     Years since quittin.8    Smokeless tobacco: Never   Substance Use Topics    Alcohol use: Yes     Alcohol/week: 2.5 standard drinks of alcohol     Types: 3 Standard drinks or equivalent per week     Comment: glass of  wine daily; instructed to refrain from alcohol 24 hrs prior to surgery        Fam Hx  Family History   Problem Relation Age of Onset    Heart Disorder Father         chf    Other (Other) Father         alzheimers    Hypertension Mother     Heart Disorder Mother         s/p thoracic aortic aneurysm repair.    Other (Other) Mother         AAA    Psychiatric Daughter         depression, ADD    Other (Other) Brother         thyroid issues    Thyroid Disorder Other         mat aunts and uncles         OBJECTIVE:  /76 (BP Location: Right arm)   Pulse 72   Temp 97.1 °F (36.2 °C) (Temporal)   Resp 16   Ht 5' 3\" (1.6 m)   Wt 170 lb 4.8 oz (77.2 kg)   LMP 01/25/2011   SpO2 94%   BMI 30.17 kg/m²   General: Alert, no acute distress  HEENT: atraumatic, sclera anicteric, oral mucosa normal   Neck: non tender, no adenopathy   Lungs: clear to ausculation bilaterally, no wheezing  Heart: Regular rate and rhythm  Abdomen: soft, non tender, non distended   Extremities: No edema  Skin: no new rash, normal color  Neuro: CN II-XII intact, 5/5 strength in bilateral extremities, normal sensation in face and extremities  Psych: appropriate affect     Diagnostic Data:    CBC/Chem  Recent Labs   Lab 05/09/24  0541   HGB 9.7*     Radiology: XR FLUOROSCOPY C-ARM TIME LESS THAN 1 HOUR (CPT=76000)    Result Date: 5/8/2024   Fluoroscopy support was provided.  Images demonstrate lumbar spinal instrumentation. Please see separate report for additional details.    elm-remote     Dictated by (CST): Juan Russo MD on 5/08/2024 at 7:22 PM     Finalized by (CST): Juan Russo MD on 5/08/2024 at 7:22 PM          XR CHEST PA + LAT CHEST (CPT=71046)    Result Date: 5/6/2024  CONCLUSION:  No acute airspace process.   LOCATION:  [facility]   Dictated by (CST): Tripp Roca MD on 5/06/2024 at 4:49 PM     Finalized by (CST): Tripp Roca MD on 5/06/2024 at 4:50 PM          ASSESSMENT / PLAN:   This is a 64-year-old female with a history of  rheumatoid arthritis, fibromyalgia, osteoarthritis, hypertension, anxiety, depression, who presents to the hospital for T10-L1 fusion, discectomy left T12-L1.     T10-L1 fusion, discectomy left T12-L1  -ortho spine following  -keep pina today   -keep hemovac today, monitor output  -mobilize, PT/OT  -pain control, add back some of her home meds today (lyrica, cymbalta, prednisone for RA)    Rheumatoid arthritis  -can add her prednisone 7.5mg daily  -can hold her arava here     Anxiety, depression  -cymbalta she takes 60mg HS, added   -doesn't use her xanax much she states, ok to hold per patient     Hypertension  -norvasc 5mg daily with holding parameter  -can hold her enalapril for now     DVT prophylaxis: SCD  Code status: FULL    Asrar Jhoan EDOUARD  [facility] Hospitalist

## 2024-05-09 NOTE — OCCUPATIONAL THERAPY NOTE
OCCUPATIONAL THERAPY EVALUATION - INPATIENT     Room Number: 411/411-A  Evaluation Date: 5/9/2024  Type of Evaluation: Initial  Presenting Problem: Thoracic 11 - Lumbar 1 fusion with instrumentation, left Thoracic 12 - Lumbar 1 discectomy, Thoracic 11 - 12 [place] - [place] osteotomy  Thoracic laminectomy 2 level (TLSO when OOB -- can get up with therapy without brace if not at bedside)    Physician Order: IP Consult to Occupational Therapy  Reason for Therapy: ADL/IADL Dysfunction and Discharge Planning    OCCUPATIONAL THERAPY ASSESSMENT   Patient is a 64 year old female admitted 5/8/2024 for POD 1 s/p T10-L1 fusion, discectomy left T12-L1.  Prior to admission, patient's baseline is overall independent with ADLs, functional mobility, IADLs -- intermittent use of one walking stick.  Patient is currently functioning near baseline with ADLs and functional mobility.  Patient is requiring stand-by assist and contact guard assist as a result of the following impairments: pain, limited reach. Occupational Therapy will continue to follow for duration of hospitalization.    Patient will benefit from continued skilled OT Services at discharge to promote functional independence and safety with additional support and return home with home health OT    PLAN  OT Treatment Plan: Balance activities;ADL training;Energy conservation/work simplification techniques;Functional transfer training;Endurance training;Equipment eval/education;Compensatory technique education  OT Device Recommendations: TBD    OCCUPATIONAL THERAPY MEDICAL/SOCIAL HISTORY   Problem List   Active Problems:    S/P fusion of thoracic spine    HOME SITUATION  Type of Home: House  Home Layout: One level  Lives With: Spouse  Shower/Tub and Equipment: Tub-shower combo; Shower chair  Other Equipment: Reacher  Drives: Yes  Patient Regularly Uses: Glasses      SUBJECTIVE  \"This is my  first time up\"     OCCUPATIONAL THERAPY EXAMINATION   OBJECTIVE  Precautions: Spine;  Lumbar brace (LSO when out of bed, not yet present but cleared to get up without, spinal precautions maintained)    WEIGHT BEARING RESTRICTION     PAIN ASSESSMENT  Ratin    ACTIVITY TOLERANCE  Pulse: 60        BP: (!) 110/93             O2 SATURATIONS  Oxygen Therapy  SPO2% on Room Air at Rest: 96    COGNITION  WFL    RANGE OF MOTION   Upper extremity ROM is within functional limits     STRENGTH ASSESSMENT  Upper extremity strength is within functional limits     ACTIVITIES OF DAILY LIVING ASSESSMENT  AM-PAC ‘6-Clicks’ Inpatient Daily Activity Short Form  How much help from another person does the patient currently need…  -   Putting on and taking off regular lower body clothing?: A Little  -   Bathing (including washing, rinsing, drying)?: A Little  -   Toileting, which includes using toilet, bedpan or urinal? : A Little  -   Putting on and taking off regular upper body clothing?: A Little  -   Taking care of personal grooming such as brushing teeth?: None  -   Eating meals?: None    AM-PAC Score:  Score: 20  Approx Degree of Impairment: 38.32%  Standardized Score (AM-PAC Scale): 42.03  CMS Modifier (G-Code): CJ    FUNCTIONAL TRANSFER ASSESSMENT  Sit to Stand: Edge of Bed; Chair  Edge of Bed: Contact Guard Assist  Chair: Contact Guard Assist    BED MOBILITY  Supine to Sit : Contact Guard Assist    FUNCTIONAL ADL ASSESSMENT  Grooming Standing: Contact Guard Assist  LB Dressing Seated: Not Tested (pt declined -- discussed compensatory strategies , pt with plan to ask  to help pt get dressed in the morning before he goes to work)    Skilled Therapy Provided: RN cleared pt for participation in occupational therapy session, which was completed in collaboration with physical therapy. Upon arrival, pt was supine in bed and agreeable to activity. No family was present during session. Pt benefited from additional time, verbal cues, RW, LHAE to maximize participation.    Pt demonstrated the skills required to  complete functional mobility to and from the bathroom with SBA and RW, simulated toilet transfer with SBA. Discussed LE dressing strategies. Pt with good receptivity and carryover of education provided.     EDUCATION PROVIDED  Patient: Role of Occupational Therapy; Plan of Care; Functional Transfer Techniques; Compensatory ADL Techniques  Patient's Response to Education: Verbalized Understanding; Returned Demonstration    The patient's Approx Degree of Impairment: 38.32% has been calculated based on documentation in the [facility] '6 clicks' Inpatient Daily Activity Short Form.  Research supports that patients with this level of impairment may benefit from HHOT.  Final disposition will be made by interdisciplinary medical team.    Patient End of Session: Up in chair;Needs met;Call light within reach;RN aware of session/findings    OT Goals  Patient self-stated goal is: home     Patient will complete LE dressing with Mod I  Comment:     Patient will complete toilet transfer with Mod I  Comment:     Patient will complete self care task at sink level with Mod I   Comment:    Patient will independently recall spine precautions  Comment:         Goals  on: 24  Frequency: 1-2 additional sessions     Patient Evaluation Complexity Level:   Occupational Profile/Medical History LOW - Brief history including review of medical or therapy records    Specific performance deficits impacting engagement in ADL/IADL LOW  1 - 3 performance deficits    Client Assessment/Performance Deficits LOW - No comorbidities nor modifications of tasks    Clinical Decision Making LOW - Analysis of occupational profile, problem-focused assessments, limited treatment options    Overall Complexity LOW     OT Session Time: 15 minutes  Therapeutic Activity: 15 minutes

## 2024-05-09 NOTE — PROGRESS NOTES
[facility]  Progress Note    Dulce Maria Frausto Patient Status:  Inpatient    3/4/1960 MRN [id number]   Location [facility] 4W/SW/SE Attending Luther Andrade MD   Hosp Day # 1 PCP Kiley Butler MD     Subjective:  Dulce Maria Frausto is a(n) 64 year old female. C/o back pain.  Left hip pain is improved compared to preop.  Sitting up in bed.  Rojas in place.        Objective/Physical Exam:  General: Alert, orientated x3.  Cooperative.  No apparent distress.    Vital Signs:  Blood pressure 113/76, pulse 72, temperature 97.1 °F (36.2 °C), temperature source Temporal, resp. rate 16, height 5' 3\" (1.6 m), weight 170 lb 4.8 oz (77.2 kg), last menstrual period 2011, SpO2 94%, not currently breastfeeding.    Pain Assessment  Pain Assessment Tool: 0-10  Pasero Sedation Scale: Sleep, easy to arouse  0-10 Scale: 8 (severe pain)  Pain Type: Surgical pain  Pain Location: Back  Pain Orientation: Lower;Left  Patient's Stated Pain Goal: 5  Pain Intervention(s): Medication    Dressing: clean, dry intact    Extremities:  No lower extremity edema noted.  Calves non-tender bilaterally.  5/5 strength to bilateral LE  Sensation to LE intact    Hemovac drain: 110 ml output overnight    Assessment/Plan:  POD 1 s/p T10-L1 fusion, discectomy left T12-L1    Mechanical DVT prophylaxis (SANTOSH/ SCDs)  Mobilize patient, PT/OT, brace when oob  Rojas out tomorrow am, keep hemovac today  Anticipate discharge to home in 1-2 days pending progress and pain control once intrathecal morphine block wears off.    PATRICIA Toney  Spine Surgery, [facility]  [phone]  2024  8:37 AM

## 2024-05-09 NOTE — PLAN OF CARE
Patient alert and oriented x4. RA. Remote tele in place. Tolerating general diet. VS q4hr. Hemovac to L lower back in place draining to gravity. Rojas in place and draining. IV fluids fluids infusing. PRN oxycodone and IV dilaudid for pain given. Call light within reach. Safety measures in place.     Problem: PAIN - ADULT  Goal: Verbalizes/displays adequate comfort level or patient's stated pain goal  Description: INTERVENTIONS:  - Encourage pt to monitor pain and request assistance  - Assess pain using appropriate pain scale  - Administer analgesics based on type and severity of pain and evaluate response  - Implement non-pharmacological measures as appropriate and evaluate response  - Consider cultural and social influences on pain and pain management  - Manage/alleviate anxiety  - Utilize distraction and/or relaxation techniques  - Monitor for opioid side effects  - Notify MD/LIP if interventions unsuccessful or patient reports new pain  - Anticipate increased pain with activity and pre-medicate as appropriate  Outcome: Progressing     Problem: RISK FOR INFECTION - ADULT  Goal: Absence of fever/infection during anticipated neutropenic period  Description: INTERVENTIONS  - Monitor WBC  - Administer growth factors as ordered  - Implement neutropenic guidelines  Outcome: Progressing     Problem: SAFETY ADULT - FALL  Goal: Free from fall injury  Description: INTERVENTIONS:  - Assess pt frequently for physical needs  - Identify cognitive and physical deficits and behaviors that affect risk of falls.  - [place] fall precautions as indicated by assessment.  - Educate pt/family on patient safety including physical limitations  - Instruct pt to call for assistance with activity based on assessment  - Modify environment to reduce risk of injury  - Provide assistive devices as appropriate  - Consider OT/PT consult to assist with strengthening/mobility  - Encourage toileting schedule  Outcome: Progressing     Problem:  DISCHARGE PLANNING  Goal: Discharge to home or other facility with appropriate resources  Description: INTERVENTIONS:  - Identify barriers to discharge w/pt and caregiver  - Include patient/family/discharge partner in discharge planning  - Arrange for needed discharge resources and transportation as appropriate  - Identify discharge learning needs (meds, wound care, etc)  - Arrange for interpreters to assist at discharge as needed  - Consider post-discharge preferences of patient/family/discharge partner  - Complete POLST form as appropriate  - Assess patient's ability to be responsible for managing their own health  - Refer to Case Management Department for coordinating discharge planning if the patient needs post-hospital services based on physician/LIP order or complex needs related to functional status, cognitive ability or social support system  Outcome: Progressing

## 2024-05-09 NOTE — PLAN OF CARE
Patient A&Ox4. VSS. RA. Up x1 and walker. Rojas catheter in place, to be discontinued tomorrow morning.  TLSO brace delivered this afternoon.   Problem: Patient Centered Care  Goal: Patient preferences are identified and integrated in the patient's plan of care  Description: Interventions:  - What would you like us to know as we care for you?   - Provide timely, complete, and accurate information to patient/family  - Incorporate patient and family knowledge, values, beliefs, and cultural backgrounds into the planning and delivery of care  - Encourage patient/family to participate in care and decision-making at the level they choose  - Honor patient and family perspectives and choices  Outcome: Progressing     Problem: Patient/Family Goals  Goal: Patient/Family Long Term Goal  Description: Patient's Long Term Goal:     Interventions:  - See additional Care Plan goals for specific interventions  Outcome: Progressing  Goal: Patient/Family Short Term Goal  Description: Patient's Short Term Goal:     Interventions:   - See additional Care Plan goals for specific interventions  Outcome: Progressing     Problem: PAIN - ADULT  Goal: Verbalizes/displays adequate comfort level or patient's stated pain goal  Description: INTERVENTIONS:  - Encourage pt to monitor pain and request assistance  - Assess pain using appropriate pain scale  - Administer analgesics based on type and severity of pain and evaluate response  - Implement non-pharmacological measures as appropriate and evaluate response  - Consider cultural and social influences on pain and pain management  - Manage/alleviate anxiety  - Utilize distraction and/or relaxation techniques  - Monitor for opioid side effects  - Notify MD/LIP if interventions unsuccessful or patient reports new pain  - Anticipate increased pain with activity and pre-medicate as appropriate  Outcome: Progressing     Problem: RISK FOR INFECTION - ADULT  Goal: Absence of fever/infection during  anticipated neutropenic period  Description: INTERVENTIONS  - Monitor WBC  - Administer growth factors as ordered  - Implement neutropenic guidelines  Outcome: Progressing     Problem: SAFETY ADULT - FALL  Goal: Free from fall injury  Description: INTERVENTIONS:  - Assess pt frequently for physical needs  - Identify cognitive and physical deficits and behaviors that affect risk of falls.  - [place] fall precautions as indicated by assessment.  - Educate pt/family on patient safety including physical limitations  - Instruct pt to call for assistance with activity based on assessment  - Modify environment to reduce risk of injury  - Provide assistive devices as appropriate  - Consider OT/PT consult to assist with strengthening/mobility  - Encourage toileting schedule  Outcome: Progressing     Problem: DISCHARGE PLANNING  Goal: Discharge to home or other facility with appropriate resources  Description: INTERVENTIONS:  - Identify barriers to discharge w/pt and caregiver  - Include patient/family/discharge partner in discharge planning  - Arrange for needed discharge resources and transportation as appropriate  - Identify discharge learning needs (meds, wound care, etc)  - Arrange for interpreters to assist at discharge as needed  - Consider post-discharge preferences of patient/family/discharge partner  - Complete POLST form as appropriate  - Assess patient's ability to be responsible for managing their own health  - Refer to Case Management Department for coordinating discharge planning if the patient needs post-hospital services based on physician/LIP order or complex needs related to functional status, cognitive ability or social support system  Outcome: Progressing

## 2024-05-10 PROCEDURE — 97116 GAIT TRAINING THERAPY: CPT

## 2024-05-10 PROCEDURE — 97530 THERAPEUTIC ACTIVITIES: CPT

## 2024-05-10 RX ORDER — DULOXETIN HYDROCHLORIDE 30 MG/1
30 CAPSULE, DELAYED RELEASE ORAL 2 TIMES DAILY
Status: SHIPPED | COMMUNITY
Start: 2024-05-10

## 2024-05-10 NOTE — PROGRESS NOTES
[facility] Hospitalist Progress Note     CC: Hospital Follow up    PCP: Kiley Butler MD       Assessment/Plan:   This is a 64-year-old female with a history of rheumatoid arthritis, fibromyalgia, osteoarthritis, hypertension, anxiety, depression, who presents to the hospital for T10-L1 fusion, discectomy left T12-L1.      T10-L1 fusion, discectomy left T12-L1  -ortho spine following  -hemovac monitoring   -mobilize, PT/OT  -pain control with oral prns, plus her chronic home meds (lyrica, cymbalta, prednisone for RA)     Rheumatoid arthritis  -prednisone 7.5mg daily  -can hold her arava here      Anxiety, depression  -cymbalta she takes 60mg HS  -doesn't use her xanax much she states, ok to hold per patient      Hypertension  -norvasc 5mg daily with holding parameter  -can hold her enalapril for now, Bps are acceptable without while here      DVT prophylaxis: SCD  Code status: FULL  Discharge timing per ortho, per patient she was thinking Saturday but will allow ortho spine to decide.      Jazmin Staples DO  [facility] Hospitalist        Subjective:     Feels well. Worked with therapy already.     OBJECTIVE:    Blood pressure 127/86, pulse 99, temperature 98.5 °F (36.9 °C), temperature source Oral, resp. rate 18, height 5' 3\" (1.6 m), weight 170 lb 4.8 oz (77.2 kg), last menstrual period 01/25/2011, SpO2 97%, not currently breastfeeding.    Temp:  [98.4 °F (36.9 °C)-98.9 °F (37.2 °C)] 98.5 °F (36.9 °C)  Pulse:  [] 99  Resp:  [18] 18  BP: (105-147)/() 127/86  SpO2:  [95 %-97 %] 97 %      Intake/Output:    Intake/Output Summary (Last 24 hours) at 5/10/2024 0928  Last data filed at 5/10/2024 0920  Gross per 24 hour   Intake --   Output 3075 ml   Net -3075 ml       Last 3 Weights   05/08/24 1141 170 lb 4.8 oz (77.2 kg)   05/06/24 0915 173 lb (78.5 kg)   07/22/22 0555 168 lb (76.2 kg)   07/19/22 1406 173 lb (78.5 kg)   03/23/22 1309 182 lb 8 oz (82.8 kg)       /86 (BP Location: Right  arm)   Pulse 99   Temp 98.5 °F (36.9 °C) (Oral)   Resp 18   Ht 5' 3\" (1.6 m)   Wt 170 lb 4.8 oz (77.2 kg)   LMP 01/25/2011   SpO2 97%   BMI 30.17 kg/m²   General: Alert, no acute distress  Lungs: clear to ausculation bilaterally  Heart: Regular rate and rhythm  Abdomen: soft, non tender  Extremities: No edema  Neuro: CN II-XII intact, 5/5 strength in bilateral extremities, normal sensation in face and extremities    Data Review:       Labs:     Recent Labs   Lab 05/09/24  0541   HGB 9.7*   HCT 32.1*       Imaging:  XR FLUOROSCOPY C-ARM TIME LESS THAN 1 HOUR (CPT=76000)    Result Date: 5/8/2024   Fluoroscopy support was provided.  Images demonstrate lumbar spinal instrumentation. Please see separate report for additional details.    elm-remote     Dictated by (CST): Juan Russo MD on 5/08/2024 at 7:22 PM     Finalized by (CST): Juan Russo MD on 5/08/2024 at 7:22 PM             Meds:      amLODIPine  5 mg Oral Daily    pantoprazole  20 mg Oral QAM AC    predniSONE  7.5 mg Oral Daily    pregabalin  200 mg Oral BID    DULoxetine  60 mg Oral Nightly    sennosides  17.2 mg Oral Nightly    docusate sodium  100 mg Oral BID    ascorbic acid  1,000 mg Oral TID CC      lactated ringers 20 mL/hr at 05/08/24 1207    lactated ringers 125 mL/hr at 05/08/24 2038       oxyCODONE **OR** oxyCODONE    HYDROmorphone    tiZANidine    diazePAM    polyethylene glycol (PEG 3350)    magnesium hydroxide    bisacodyl    fleet enema    ondansetron    metoclopramide    benzocaine-menthol    naloxone

## 2024-05-10 NOTE — PLAN OF CARE
Pt is POD #1-2. Vital signs within normal limits. PRN Oxy IR and Zanaflex provided for pain. Alert and oriented x4. CMS intact. On room air. Tolerating general diet. Rojas in place, to be removed this AM per order. Dressing clean, dry, and intact. Up with one assist and the walker. Hemovac to gravity. SCDs and TEDs for DVT prophylaxis. Appropriate safety precautions maintained. Bed locked in lowest position, call light within reach, and frequent rounding maintained. Plan for home pending clearance.    Problem: Patient Centered Care  Goal: Patient preferences are identified and integrated in the patient's plan of care  Description: Interventions:  - What would you like us to know as we care for you?   - Provide timely, complete, and accurate information to patient/family  - Incorporate patient and family knowledge, values, beliefs, and cultural backgrounds into the planning and delivery of care  - Encourage patient/family to participate in care and decision-making at the level they choose  - Honor patient and family perspectives and choices  Outcome: Progressing     Problem: PAIN - ADULT  Goal: Verbalizes/displays adequate comfort level or patient's stated pain goal  Description: INTERVENTIONS:  - Encourage pt to monitor pain and request assistance  - Assess pain using appropriate pain scale  - Administer analgesics based on type and severity of pain and evaluate response  - Implement non-pharmacological measures as appropriate and evaluate response  - Consider cultural and social influences on pain and pain management  - Manage/alleviate anxiety  - Utilize distraction and/or relaxation techniques  - Monitor for opioid side effects  - Notify MD/LIP if interventions unsuccessful or patient reports new pain  - Anticipate increased pain with activity and pre-medicate as appropriate  Outcome: Progressing     Problem: RISK FOR INFECTION - ADULT  Goal: Absence of fever/infection during anticipated neutropenic  period  Description: INTERVENTIONS  - Monitor WBC  - Administer growth factors as ordered  - Implement neutropenic guidelines  Outcome: Progressing     Problem: SAFETY ADULT - FALL  Goal: Free from fall injury  Description: INTERVENTIONS:  - Assess pt frequently for physical needs  - Identify cognitive and physical deficits and behaviors that affect risk of falls.  - [place] fall precautions as indicated by assessment.  - Educate pt/family on patient safety including physical limitations  - Instruct pt to call for assistance with activity based on assessment  - Modify environment to reduce risk of injury  - Provide assistive devices as appropriate  - Consider OT/PT consult to assist with strengthening/mobility  - Encourage toileting schedule  Outcome: Progressing     Problem: DISCHARGE PLANNING  Goal: Discharge to home or other facility with appropriate resources  Description: INTERVENTIONS:  - Identify barriers to discharge w/pt and caregiver  - Include patient/family/discharge partner in discharge planning  - Arrange for needed discharge resources and transportation as appropriate  - Identify discharge learning needs (meds, wound care, etc)  - Arrange for interpreters to assist at discharge as needed  - Consider post-discharge preferences of patient/family/discharge partner  - Complete POLST form as appropriate  - Assess patient's ability to be responsible for managing their own health  - Refer to Case Management Department for coordinating discharge planning if the patient needs post-hospital services based on physician/LIP order or complex needs related to functional status, cognitive ability or social support system  Outcome: Progressing

## 2024-05-10 NOTE — PHYSICAL THERAPY NOTE
PHYSICAL THERAPY TREATMENT NOTE - INPATIENT     Room Number: 411/411-A       Presenting Problem: S/P T11-L1 fusion, T12-L1 disectomy, T11-12 osteotomy 24  Co-Morbidities : HTN, OA, spinal stenosis, anxiety, depression, BELKIS, hx L2-3 & L5-S1 fusion & hardware replacement L1-S1 2022    Problem List  Active Problems:    S/P fusion of thoracic spine      PHYSICAL THERAPY ASSESSMENT   Patient demonstrates good  progress this session, goals  progressing with ambulation and transfers this session.     Patient continues to function below baseline with bed mobility, transfers, gait, stair negotiation, and performing household tasks.  Contributing factors to remaining limitations include decreased functional strength, pain, impaired dynamic balance, and limited lumbar ROM. Pt demos adequate safety and stability with functional mobility tasks and is safe to discharge from PT standpoint. Physical Therapy will continue to follow patient for duration of hospitalization.    Patient continues to benefit from continued skilled PT services: at discharge to promote functional independence and safety with additional support and return home with home health PT.    PLAN  PT Treatment Plan: Bed mobility;Body mechanics;Endurance;Energy conservation;Patient education;Gait training;Strengthening;Stair training;Transfer training  Frequency (Obs): 5x/week    SUBJECTIVE  Agreeable to therapy    OBJECTIVE  Precautions: Spine;TLSO (TLSO when out of bed, not yet present but cleared to get up without, spinal precautions maintained)      PAIN ASSESSMENT   Ratin  Location: back  Management Techniques: Activity promotion;Body mechanics;Breathing techniques    BALANCE  Static Sitting: Normal  Dynamic Sitting: Normal  Static Standing: Normal  Dynamic Standing: Good        AM-PAC '6-Clicks' INPATIENT SHORT FORM - BASIC MOBILITY  How much difficulty does the patient currently have...  Patient Difficulty: Turning over in bed (including adjusting  bedclothes, sheets and blankets)?: A Little   Patient Difficulty: Sitting down on and standing up from a chair with arms (e.g., wheelchair, bedside commode, etc.): A Little   Patient Difficulty: Moving from lying on back to sitting on the side of the bed?: A Little   How much help from another person does the patient currently need...   Help from Another: Moving to and from a bed to a chair (including a wheelchair)?: A Little   Help from Another: Need to walk in hospital room?: A Little   Help from Another: Climbing 3-5 steps with a railing?: A Little     AM-PAC Score:  Raw Score: 18   Approx Degree of Impairment: 46.58%   Standardized Score (AM-PAC Scale): 43.63   CMS Modifier (G-Code): CK    FUNCTIONAL ABILITY STATUS  Functional Mobility/Gait Assessment  Gait Assistance: Supervision  Distance (ft): 250 ft  Assistive Device: Rolling walker  Pattern: Within Functional Limits  Rolling: supervision  Supine to Sit: supervision  Sit to Stand: stand-by assist with RW    Additional information: Pt agreeable to therapy, identified by name and , gait belt donned for mobility. Spouse present at bedside. Pt able to ambulate community distances and negotiate stairs safely. Pt ed provided on importance of OOB mobility once home, use of ice, use of RW, and car transfers, all with good pt understanding.     The patient's Approx Degree of Impairment: 46.58% has been calculated [facility] Daily Activity Short Form.  Research supports that patients with this level of impairment may benefit from HHPT.  Final disposition will be made by interdisciplinary medical team.      Patient End of Session: Up in chair;Needs met;Call light within reach;RN aware of session/findings;Family present    CURRENT GOALS   Goals to be met by: 24  Patient Goal Patient's self-stated goal is: return home safely   Goal #1 Patient is able to demonstrate supine - sit EOB @ level: independent      Goal #1    Current Status  supervision    Goal #2 Patient is able to demonstrate transfers EOB to/from BSC at assistance level: modified independent with walker - rolling      Goal #2  Current Status  SBA with RW    Goal #3 Patient is able to ambulate 150 feet with assist device: walker - rolling at assistance level: modified independent   Goal #3   Current Status  250 ft with RW, supervision    Goal #4 Patient will negotiate 3 stairs/one curb w/ assistive device and supervision   Goal #4   Current Status  CGA with rails, 4 stairs    Goal #5 Patient to demonstrate independence with home activity/exercise instructions provided to patient in preparation for discharge.   Goal #5   Current Status  ongoing   Goal #6     Goal #6  Current Status       Gait Training: 15 minutes  Therapeutic Activity: 8 minutes

## 2024-05-10 NOTE — PROGRESS NOTES
[facility]  Progress Note    Dulce Maria Frausto Patient Status:  Inpatient    3/4/1960 MRN [id number]   Location [facility] 4W/SW/SE Attending Luther Andrade MD   Hosp Day # 2 PCP Kiley Butler MD     Subjective:  Dulce Maria Frausto is a(n) 64 year old female. C/o thoraco lumbar back pain, improved from yesterday.  Moving ok.  Denies leg pain.    Objective/Physical Exam:  General: Alert, orientated x3.  Cooperative.  No apparent distress.    Vital Signs:  Blood pressure 115/77, pulse 99, temperature 98 °F (36.7 °C), temperature source Oral, resp. rate 18, height 5' 3\" (1.6 m), weight 170 lb 4.8 oz (77.2 kg), last menstrual period 2011, SpO2 97%, not currently breastfeeding.    Pain Assessment  Pain Assessment Tool: 0-10  [place] Sedation Scale: Sleep, easy to arouse  0-10 Scale: 8 (severe pain)  Pain Type: Surgical pain  Pain Location: Back  Pain Orientation: Left;Lower  Patient's Stated Pain Goal: 5  Pain Intervention(s): Medication    Dressing: clean, dry intact    Extremities:  No lower extremity edema noted.  Calves non-tender bilaterally.  5/5 strength to bilateral LE    Assessment/Plan:  POD 2 - s/p T10-L1 fusion, discectomy left T12-L1    Drain removed  Ok to discharge to home tomorrow, needs one more day for adequate pain control.    PATRICIA Toney  Spine Surgery, [facility]  [phone]  5/10/2024  3:00 PM

## 2024-05-10 NOTE — PLAN OF CARE
Post op day 2. Hemovac drain removed. Pain managed with oxycodone and zanaflex as needed. Up with standby assistance and walker. Tolerating diet and voiding freely. Plan to discharge home tomorrow.     Problem: Patient Centered Care  Goal: Patient preferences are identified and integrated in the patient's plan of care  Description: Interventions:  - What would you like us to know as we care for you?  - Provide timely, complete, and accurate information to patient/family  - Incorporate patient and family knowledge, values, beliefs, and cultural backgrounds into the planning and delivery of care  - Encourage patient/family to participate in care and decision-making at the level they choose  - Honor patient and family perspectives and choices  Outcome: Progressing      Problem: PAIN - ADULT  Goal: Verbalizes/displays adequate comfort level or patient's stated pain goal  Description: INTERVENTIONS:  - Encourage pt to monitor pain and request assistance  - Assess pain using appropriate pain scale  - Administer analgesics based on type and severity of pain and evaluate response  - Implement non-pharmacological measures as appropriate and evaluate response  - Consider cultural and social influences on pain and pain management  - Manage/alleviate anxiety  - Utilize distraction and/or relaxation techniques  - Monitor for opioid side effects  - Notify MD/LIP if interventions unsuccessful or patient reports new pain  - Anticipate increased pain with activity and pre-medicate as appropriate  Outcome: Progressing     Problem: RISK FOR INFECTION - ADULT  Goal: Absence of fever/infection during anticipated neutropenic period  Description: INTERVENTIONS  - Monitor WBC  - Administer growth factors as ordered  - Implement neutropenic guidelines  Outcome: Progressing     Problem: SAFETY ADULT - FALL  Goal: Free from fall injury  Description: INTERVENTIONS:  - Assess pt frequently for physical needs  - Identify cognitive and physical  deficits and behaviors that affect risk of falls.  - [place] fall precautions as indicated by assessment.  - Educate pt/family on patient safety including physical limitations  - Instruct pt to call for assistance with activity based on assessment  - Modify environment to reduce risk of injury  - Provide assistive devices as appropriate  - Consider OT/PT consult to assist with strengthening/mobility  - Encourage toileting schedule  Outcome: Progressing     Problem: DISCHARGE PLANNING  Goal: Discharge to home or other facility with appropriate resources  Description: INTERVENTIONS:  - Identify barriers to discharge w/pt and caregiver  - Include patient/family/discharge partner in discharge planning  - Arrange for needed discharge resources and transportation as appropriate  - Identify discharge learning needs (meds, wound care, etc)  - Arrange for interpreters to assist at discharge as needed  - Consider post-discharge preferences of patient/family/discharge partner  - Complete POLST form as appropriate  - Assess patient's ability to be responsible for managing their own health  - Refer to Case Management Department for coordinating discharge planning if the patient needs post-hospital services based on physician/LIP order or complex needs related to functional status, cognitive ability or social support system  Outcome: Progressing

## 2024-05-11 VITALS
HEIGHT: 63 IN | HEART RATE: 76 BPM | TEMPERATURE: 98 F | SYSTOLIC BLOOD PRESSURE: 100 MMHG | BODY MASS INDEX: 30.18 KG/M2 | OXYGEN SATURATION: 97 % | WEIGHT: 170.31 LBS | DIASTOLIC BLOOD PRESSURE: 56 MMHG | RESPIRATION RATE: 18 BRPM

## 2024-05-11 LAB — HGB BLD-MCNC: 9.8 G/DL

## 2024-05-11 PROCEDURE — 85018 HEMOGLOBIN: CPT | Performed by: HOSPITALIST

## 2024-05-11 RX ORDER — CALCITONIN SALMON 200 [IU]/.09ML
1 SPRAY, METERED NASAL DAILY
Status: SHIPPED | COMMUNITY
Start: 2024-05-11

## 2024-05-11 RX ORDER — HYDROCODONE BITARTRATE AND ACETAMINOPHEN 10; 325 MG/1; MG/1
TABLET ORAL
Qty: 50 TABLET | Refills: 0 | Status: SHIPPED | OUTPATIENT
Start: 2024-05-11 | End: 2024-05-17

## 2024-05-11 RX ORDER — MULTIVIT WITH MINERALS/LUTEIN
1000 TABLET ORAL 2 TIMES DAILY
Status: SHIPPED | COMMUNITY
Start: 2024-05-11

## 2024-05-11 RX ORDER — TIZANIDINE 2 MG/1
TABLET ORAL EVERY 6 HOURS PRN
Status: SHIPPED | COMMUNITY
Start: 2024-05-11

## 2024-05-11 NOTE — CM/SW NOTE
Received MDO for discharge plan. PT/OT recommending home health. Plan for discharge to home today. Spoke with patient for assessment. Patient and spouse live in a one level home w/ 3 [address]ERY[address]. Patient is independent with ADLs/IADLs. She does not use an assisted walking device but owns a walker and walking stick. Denies h/o HH or SNF.    Discussed discharge plan, patient to return home w/ family support. Discussed HH recommendations, patient agreeable. Initiated referral via [facility], F2F complete.    SW to f/u with list of options and patient choice, updated John MARTINES.    11a: List of options tubed to John MARTINES, awaiting patient's decision.    1130a: Patient prefers [facility] ([phone]), notified discharge today.    LESLY Louis [id number]

## 2024-05-11 NOTE — PLAN OF CARE
Problem: Patient Centered Care  Goal: Patient preferences are identified and integrated in the patient's plan of care  Description: Interventions:  - What would you like us to know as we care for you?   - Provide timely, complete, and accurate information to patient/family  - Incorporate patient and family knowledge, values, beliefs, and cultural backgrounds into the planning and delivery of care  - Encourage patient/family to participate in care and decision-making at the level they choose  - Honor patient and family perspectives and choices  Outcome: Progressing     Problem: Patient/Family Goals  Goal: Patient/Family Long Term Goal  Description: Patient's Long Term Goal:     Interventions:  -   - See additional Care Plan goals for specific interventions  Outcome: Progressing  Goal: Patient/Family Short Term Goal  Description: Patient's Short Term Goal:     Interventions:   -   - See additional Care Plan goals for specific interventions  Outcome: Progressing     Problem: PAIN - ADULT  Goal: Verbalizes/displays adequate comfort level or patient's stated pain goal  Description: INTERVENTIONS:  - Encourage pt to monitor pain and request assistance  - Assess pain using appropriate pain scale  - Administer analgesics based on type and severity of pain and evaluate response  - Implement non-pharmacological measures as appropriate and evaluate response  - Consider cultural and social influences on pain and pain management  - Manage/alleviate anxiety  - Utilize distraction and/or relaxation techniques  - Monitor for opioid side effects  - Notify MD/LIP if interventions unsuccessful or patient reports new pain  - Anticipate increased pain with activity and pre-medicate as appropriate  Outcome: Progressing     Problem: RISK FOR INFECTION - ADULT  Goal: Absence of fever/infection during anticipated neutropenic period  Description: INTERVENTIONS  - Monitor WBC  - Administer growth factors as ordered  - Implement neutropenic  guidelines  Outcome: Progressing     Problem: SAFETY ADULT - FALL  Goal: Free from fall injury  Description: INTERVENTIONS:  - Assess pt frequently for physical needs  - Identify cognitive and physical deficits and behaviors that affect risk of falls.  - [place] fall precautions as indicated by assessment.  - Educate pt/family on patient safety including physical limitations  - Instruct pt to call for assistance with activity based on assessment  - Modify environment to reduce risk of injury  - Provide assistive devices as appropriate  - Consider OT/PT consult to assist with strengthening/mobility  - Encourage toileting schedule  Outcome: Progressing     Problem: DISCHARGE PLANNING  Goal: Discharge to home or other facility with appropriate resources  Description: INTERVENTIONS:  - Identify barriers to discharge w/pt and caregiver  - Include patient/family/discharge partner in discharge planning  - Arrange for needed discharge resources and transportation as appropriate  - Identify discharge learning needs (meds, wound care, etc)  - Arrange for interpreters to assist at discharge as needed  - Consider post-discharge preferences of patient/family/discharge partner  - Complete POLST form as appropriate  - Assess patient's ability to be responsible for managing their own health  - Refer to Case Management Department for coordinating discharge planning if the patient needs post-hospital services based on physician/LIP order or complex needs related to functional status, cognitive ability or social support system  Outcome: Progressing     Pt alert and oriented. Surgical dressing CDI. Ambulates independently with the walker. TLSO brace when out of bed. On remote tele. Tolerating diet. Oxy and zanaflex for pain PRN. Call light within reach. Bed in lowest and locked position. Plan for home today.

## 2024-05-11 NOTE — PLAN OF CARE
Pt a/o x4. On RA. Feeling good today. Oxycodone given for pain. Up SBA. Plan to discharge home.       Problem: PAIN - ADULT  Goal: Verbalizes/displays adequate comfort level or patient's stated pain goal  Description: INTERVENTIONS:  - Encourage pt to monitor pain and request assistance  - Assess pain using appropriate pain scale  - Administer analgesics based on type and severity of pain and evaluate response  - Implement non-pharmacological measures as appropriate and evaluate response  - Consider cultural and social influences on pain and pain management  - Manage/alleviate anxiety  - Utilize distraction and/or relaxation techniques  - Monitor for opioid side effects  - Notify MD/LIP if interventions unsuccessful or patient reports new pain  - Anticipate increased pain with activity and pre-medicate as appropriate  Outcome: Progressing     Problem: RISK FOR INFECTION - ADULT  Goal: Absence of fever/infection during anticipated neutropenic period  Description: INTERVENTIONS  - Monitor WBC  - Administer growth factors as ordered  - Implement neutropenic guidelines  Outcome: Progressing     Problem: SAFETY ADULT - FALL  Goal: Free from fall injury  Description: INTERVENTIONS:  - Assess pt frequently for physical needs  - Identify cognitive and physical deficits and behaviors that affect risk of falls.  - [place] fall precautions as indicated by assessment.  - Educate pt/family on patient safety including physical limitations  - Instruct pt to call for assistance with activity based on assessment  - Modify environment to reduce risk of injury  - Provide assistive devices as appropriate  - Consider OT/PT consult to assist with strengthening/mobility  - Encourage toileting schedule  Outcome: Progressing     Problem: DISCHARGE PLANNING  Goal: Discharge to home or other facility with appropriate resources  Description: INTERVENTIONS:  - Identify barriers to discharge w/pt and caregiver  - Include  patient/family/discharge partner in discharge planning  - Arrange for needed discharge resources and transportation as appropriate  - Identify discharge learning needs (meds, wound care, etc)  - Arrange for interpreters to assist at discharge as needed  - Consider post-discharge preferences of patient/family/discharge partner  - Complete POLST form as appropriate  - Assess patient's ability to be responsible for managing their own health  - Refer to Case Management Department for coordinating discharge planning if the patient needs post-hospital services based on physician/LIP order or complex needs related to functional status, cognitive ability or social support system  Outcome: Progressing

## 2024-05-11 NOTE — PLAN OF CARE
Pt discharging home today with [facility]      Problem: Patient Centered Care  Goal: Patient preferences are identified and integrated in the patient's plan of care  Description: Interventions:  - What would you like us to know as we care for you?   - Provide timely, complete, and accurate information to patient/family  - Incorporate patient and family knowledge, values, beliefs, and cultural backgrounds into the planning and delivery of care  - Encourage patient/family to participate in care and decision-making at the level they choose  - Honor patient and family perspectives and choices  Outcome: Adequate for Discharge     Problem: Patient/Family Goals  Goal: Patient/Family Long Term Goal  Description: Patient's Long Term Goal:     Interventions:  -   - See additional Care Plan goals for specific interventions  Outcome: Adequate for Discharge  Goal: Patient/Family Short Term Goal  Description: Patient's Short Term Goal:     Interventions:   -   - See additional Care Plan goals for specific interventions  Outcome: Adequate for Discharge     Problem: PAIN - ADULT  Goal: Verbalizes/displays adequate comfort level or patient's stated pain goal  Description: INTERVENTIONS:  - Encourage pt to monitor pain and request assistance  - Assess pain using appropriate pain scale  - Administer analgesics based on type and severity of pain and evaluate response  - Implement non-pharmacological measures as appropriate and evaluate response  - Consider cultural and social influences on pain and pain management  - Manage/alleviate anxiety  - Utilize distraction and/or relaxation techniques  - Monitor for opioid side effects  - Notify MD/LIP if interventions unsuccessful or patient reports new pain  - Anticipate increased pain with activity and pre-medicate as appropriate  5/11/2024 1212 by John Wesley, RN  Outcome: Adequate for Discharge  5/11/2024 1015 by John Wesley, RN  Outcome: Progressing     Problem: RISK FOR INFECTION -  ADULT  Goal: Absence of fever/infection during anticipated neutropenic period  Description: INTERVENTIONS  - Monitor WBC  - Administer growth factors as ordered  - Implement neutropenic guidelines  5/11/2024 1212 by John Wesley RN  Outcome: Adequate for Discharge  5/11/2024 1015 by John Wesley RN  Outcome: Progressing     Problem: SAFETY ADULT - FALL  Goal: Free from fall injury  Description: INTERVENTIONS:  - Assess pt frequently for physical needs  - Identify cognitive and physical deficits and behaviors that affect risk of falls.  - [place] fall precautions as indicated by assessment.  - Educate pt/family on patient safety including physical limitations  - Instruct pt to call for assistance with activity based on assessment  - Modify environment to reduce risk of injury  - Provide assistive devices as appropriate  - Consider OT/PT consult to assist with strengthening/mobility  - Encourage toileting schedule  5/11/2024 1212 by John Wesley RN  Outcome: Adequate for Discharge  5/11/2024 1015 by John Wesley RN  Outcome: Progressing     Problem: DISCHARGE PLANNING  Goal: Discharge to home or other facility with appropriate resources  Description: INTERVENTIONS:  - Identify barriers to discharge w/pt and caregiver  - Include patient/family/discharge partner in discharge planning  - Arrange for needed discharge resources and transportation as appropriate  - Identify discharge learning needs (meds, wound care, etc)  - Arrange for interpreters to assist at discharge as needed  - Consider post-discharge preferences of patient/family/discharge partner  - Complete POLST form as appropriate  - Assess patient's ability to be responsible for managing their own health  - Refer to Case Management Department for coordinating discharge planning if the patient needs post-hospital services based on physician/LIP order or complex needs related to functional status, cognitive ability or social support system  5/11/2024  1212 by John Wesley, RN  Outcome: Adequate for Discharge  5/11/2024 1015 by John Wesley, RN  Outcome: Progressing

## 2024-05-11 NOTE — DISCHARGE SUMMARY
[facility]  part of [facility] Discharge Summary   Patient ID:  Dulce Maria Frausto  [id number]  64 year old  3/4/1960    Admit date: 5/8/2024    Discharge date and time: 5/11/2024    Attending Physician: Luther Andrade MD     Primary Care Physician: Kiley Butler MD     Reason for admission T10-L1 fusion, discectomy left T12-L1  (see HPI on HP for further detail)    Discharged Condition: stable    Disposition: home    Risk of Readmission Lace+ Score: 48  59-90 High Risk  29-58 Medium Risk  0-28   Low Risk    Important follow up:  -Follow-up per orthospine  - Follow-up PCP as needed, please call PCP if issues with blood pressure  -Can resume enalapril 20 mg if systolic blood pressure greater than 120.  If after resuming enlapril the systolic blood pressure is still over 120 then can resume amlodipine 5mg.  If any blood pressures over 140 can resume both.  If any blood pressures under 100 hold any blood pressure medications and then resume first with enalapril when systolic blood pressure over 120.    Discharge meds  I reviewed and reconciled current and discharge medications on the day of discharge with the changes reflected below.  parameters for BP meds in DC instructions and verbally discussed with patient and  at bedside prior to discharge     Medication List        START taking these medications      calcitonin (salmon) 200 UNIT/ACT Soln  Commonly known as: Miacalcin     cholecalciferol 125 MCG (5000 UT) Tabs  Commonly known as: Vitamin D3     tiZANidine 2 MG Tabs  Commonly known as: Zanaflex     vitamin C 1000 MG Tabs            CHANGE how you take these medications      HYDROcodone-acetaminophen  MG Tabs  Commonly known as: Norco  Take 1-2 tablets by mouth every 6 (six) hours as needed for Pain (chronic pain and surgery) for 4 days, THEN 0.5-1 tablets every 4 (four) hours as needed for Pain (chronic pain and surgery).  Start taking on: May 11,  2024  What changed: See the new instructions.            CONTINUE taking these medications      ALPRAZolam 0.25 MG Tabs  Commonly known as: Xanax  take one tablet tid prn for anxiety     amLODIPine 5 MG Tabs  Commonly known as: Norvasc  TAKE 1 TABLET BY MOUTH EVERY DAY     Blood Pressure Monitor Debo  Check blood pressure daily and as needed     DULoxetine 30 MG Cpep  Commonly known as: Cymbalta     enalapril 20 MG Tabs  Commonly known as: Vasotec  TAKE 1 TABLET BY MOUTH EVERY DAY     Fish Oil 300 MG Caps     leflunomide 20 MG Tabs  Commonly known as: Arava     omeprazole 20 MG Cpdr  Commonly known as: PriLOSEC  Take 1 capsule (20 mg total) by mouth daily.     One Daily Womens 50+ Tabs     Polyethylene Glycol 3350 17 g Pack  Commonly known as: MIRALAX     predniSONE 5 MG Tabs  Commonly known as: Deltasone     pregabalin 200 MG Caps  Commonly known as: LYRICA  TAKE 1 CAPSULE BY MOUTH TWICE A DAY     Simponi 50 MG/0.5ML Soaj  Generic drug: Golimumab               Where to Get Your Medications        These medications were sent to [place] [id number] [place] [facility] - [place], [place] - [phone], [phone]  [address] [place] [address]      Phone: [phone]   HYDROcodone-acetaminophen  MG Tabs       HPI per chart  This is a 64-year-old female with a history of rheumatoid arthritis, fibromyalgia, osteoarthritis, hypertension, anxiety, depression, who presents to the hospital for T10-L1 fusion, discectomy left T12-L1. She is doing ok this AM. No severe pain. Her vital signs are stable. No BM yet. She does have pina intact. Hemovac drain with 110ml overnight.    Hospital Course  Patient did well postop.  Did have a drop in hemoglobin but repeat was stable from 9.7-9.8.  Discussed with patient that she can likely resume her home blood pressure medications but parameters given.  Once the blood pressures greater than 120 can resume enalapril first.  If still greater than 120 then resume amlodipine 5.  If  blood pressures greater than 140 then can resume both or if any blood pressures under 100 and hold what ever she has been taking.  After holding resume following the same pattern once blood pressure is greater than 120.  Discharge Diagnoses:   T10-L1 fusion, discectomy left T12-L1  -ortho spine following  -hemovac monitoring, acute blood loss anemia dropped to 9.7, repeat stable at 9.8 hemodynamically stable  -mobilize, PT/OT-> cleared by orthospine and PT OT for home  -pain control with oral prns, plus her chronic home meds (lyrica, cymbalta, prednisone for RA)     Rheumatoid arthritis  -prednisone 7.5mg daily  -can hold her arava here      Anxiety, depression  -cymbalta she takes 60mg HS  -doesn't use her xanax much she states, ok to hold per patient      Hypertension  -Has not been getting her blood pressure meds here.  Suspect that her blood pressure will increase when she gets home.  -Once the blood pressures greater than 120 can resume enalapril first.  If still greater than 120 then resume amlodipine 5.  If blood pressures greater than 140 then can resume both or if any blood pressures under 100 and hold what ever she has been taking.  After holding resume following the same pattern once blood pressure is greater than 120.  Consults: IP CONSULT TO HOSPITALIST  IP CONSULT TO CASE MANAGEMENT  IP CONSULT TO RESPIRATORY CARE    Radiology: XR FLUOROSCOPY C-ARM TIME LESS THAN 1 HOUR (CPT=76000)    Result Date: 5/8/2024  PROCEDURE: XR FLUORO PHYSICIAN TIME< 1 HOUR (CPT=76000)  COMPARISON: [facility], XR FLUOROSCOPY C-ARM  TIME< 1 HOUR (CPT=76000), 7/22/2022, 8:10 AM.  [facility], XR FLUOROSCOPY C-ARM  TIME< 1 HOUR (CPT=76000), 10/11/2019, 2:34 PM.  INDICATIONS: Thoracic 11 - Lumbar 1 fusion with instrumentation, left Thoracic 12 - Lumbar 1 discectomy, Thoracic 11 - 12..   FLUORO TIME: 14.5 seconds TOTAL IMAGES: 3 AIR KERMA: 2.95 mGy DAP: 0.09 mGym^2  FINDINGS/CONCLUSION:          Fluoroscopy support was provided.  Images demonstrate lumbar spinal instrumentation. Please see separate report for additional details.    [email or web address]     Dictated by (CST): Juan Russo MD on 5/08/2024 at 7:22 PM     Finalized by (CST): Juan Russo MD on 5/08/2024 at 7:22 PM          XR CHEST PA + LAT CHEST (CPT=71046)    Result Date: 5/6/2024  PROCEDURE:  XR CHEST PA + LAT CHEST (CPT=71046)  INDICATIONS:  Z01.818 Preop testing  COMPARISON:  None.  TECHNIQUE:  PA and lateral chest radiographs were obtained.  PATIENT STATED HISTORY: (As transcribed by Technologist)  Pre op for spinal surgery.    FINDINGS:  LUNGS:  No focal consolidation.  Normal vascularity. CARDIAC:  Normal size cardiac silhouette. MEDIASTINUM:  Tortuous and ectatic aorta. PLEURA:  Normal.  No pleural effusions. BONES:  Postsurgical changes of prior lumbar spine fusion from posterior approach.  Multilevel degenerative changes which are moderate within the midthoracic spine.            CONCLUSION:  No acute airspace process.   LOCATION:  [facility]   Dictated by (CST): Tripp Roca MD on 5/06/2024 at 4:49 PM     Finalized by (CST): Tripp Roca MD on 5/06/2024 at 4:50 PM          Operative Procedures: Procedure(s) (LRB):  Thoracic 11 - Lumbar 1 fusion with instrumentation, left Thoracic 12 - Lumbar 1 discectomy, Thoracic 11 - 12 [place] - [place] osteotomy (N/A)  Thoracic laminectomy 2 level (N/A)     Day of discharge feels well today, no lightheadedness no dizziness.  No chest pain or shortness of breath, voiding tolerating diet and had bowel movement.  Pain controlled.    Exam  Vitals:    05/11/24 0850   BP: 100/56   Pulse:    Resp: 18   Temp: 98.2 °F (36.8 °C)     No acute distress, alert and orientedx3  Lungs Clear  Heart Regular  Abdomen Benign    Total Time Coordinating Care: > than 30 minutes  Note: This chart was prepared using voice recognition software and may contain unintended word substitution errors.     Discussed with  at  bedside  Patient had opportunity to ask questions and state understand and agree with therapeutic plan as outlined

## (undated) DEVICE — NEEDLE BLNT 18GA L1.5IN FILL DISP PRECISGLDE

## (undated) DEVICE — SOLUTION IRRIG 1000ML 0.9% NACL USP BTL

## (undated) DEVICE — NEEDLE SPNL 20GA L3.5IN YEL QNCKE STYL DISP

## (undated) DEVICE — SUT VCRL 1 27IN OS-6 ABSRB UD L36MM 1/2 CIR

## (undated) DEVICE — SYRINGE MED 10ML LL TIP W/O SFTY DISP

## (undated) DEVICE — COVER BK TBL L72IN BLU PD HVY DTY BK TBL CVR

## (undated) DEVICE — ENCORE® LATEX MICRO SIZE 6.5, STERILE LATEX POWDER-FREE SURGICAL GLOVE: Brand: ENCORE

## (undated) DEVICE — 3.0MM PRECISION NEURO (MATCH HEAD)

## (undated) DEVICE — WRAP COOLING BACK W/NO PILLOW

## (undated) DEVICE — 1 ML INSULIN SYRINGE REGULAR LUER TIP: Brand: MONOJECT

## (undated) DEVICE — PAD,NON-ADHERENT,3X8,STERILE,LF,1/PK: Brand: MEDLINE

## (undated) DEVICE — SHEET,DRAPE,53X77,STERILE: Brand: MEDLINE

## (undated) DEVICE — TRAY CATH FOLEY 16FR INCLUDE BARDX IC COMPLT CARE

## (undated) DEVICE — C-ARMOR C-ARM EQUIPMENT COVERS CLEAR STERILE UNIVERSAL FIT 12 PER CASE: Brand: C-ARMOR

## (undated) DEVICE — MONITORING NEUROPHYSIOLOGICAL

## (undated) DEVICE — KIT EVAC 400CC 3/16IN PVC RND DRN Y CONN

## (undated) DEVICE — SNAP KOVER: Brand: UNBRANDED

## (undated) DEVICE — CONTAINER,SPECIMEN,OR STERILE,4OZ: Brand: MEDLINE

## (undated) DEVICE — SPONGE LAP 18X18IN WHT COT 4 PLY FLD STRUNG

## (undated) DEVICE — GAMMEX® PI HYBRID SIZE 9, STERILE POWDER-FREE SURGICAL GLOVE, POLYISOPRENE AND NEOPRENE BLEND: Brand: GAMMEX

## (undated) DEVICE — 3 ML SYRINGE LUER-LOCK TIP: Brand: MONOJECT

## (undated) DEVICE — ADHESIVE SKIN TOP FOR WND CLSR DERMBND ADV

## (undated) DEVICE — DRAPE UTIL W15XL25IN FAB FLAME RESIST LO LINT

## (undated) DEVICE — FRAZIER SUCTION INSTRUMENT 10 FR W/CONTROL VENT & OBTURATOR: Brand: FRAZIER

## (undated) DEVICE — GAMMEX® PI HYBRID SIZE 7, STERILE POWDER-FREE SURGICAL GLOVE, POLYISOPRENE AND NEOPRENE BLEND: Brand: GAMMEX

## (undated) DEVICE — SUT MCRYL 2-0 36IN CT-1 ABSRB UD L36MM TAPR P

## (undated) DEVICE — DECANTER BAG 9": Brand: MEDLINE INDUSTRIES, INC.

## (undated) DEVICE — SYRINGE MED 20ML STD CLR PLAS LL TIP N CTRL

## (undated) DEVICE — ASPIRATION/ANTICOAGULATION SET: Brand: HAEMONETICS CELL SAVER SYSTEM

## (undated) DEVICE — Device: Brand: JELCO

## (undated) DEVICE — Device

## (undated) DEVICE — FRAZIER SUCTION INSTRUMENT 12 FR W/CONTROL VENT & OBTURATOR: Brand: FRAZIER

## (undated) DEVICE — GAUZE,SPONGE,FLUFF,6"X6.75",STRL,5/TRAY: Brand: MEDLINE

## (undated) DEVICE — KIT HEMSTAT MTRX 8ML PORCINE GEL HUM THROM

## (undated) DEVICE — PACK CDS LAMINECTOMY

## (undated) DEVICE — SUT MCRYL 4-0 18IN PS-2 ABSRB UD 19MM 3/8 CIR

## (undated) DEVICE — PREMIERPRO LAP SPONGE 4"X18" STERILE, 5/PK: Brand: PREMIERPRO

## (undated) DEVICE — PENCIL ES BTTN SWCH W/ TIP HOLSTER E-Z CLN

## (undated) NOTE — LETTER
[address] [place] [address]      Authorization for Surgical Operation and Procedure     Date:___________                                                                                                         Time:__________  1. I hereby Lawyer Titi MD, my physician and his/her assistants (if applicable), which may include medical students, residents, and/or fellows, to perform the following surgical operation/ procedure and administer such anesthesia as may be determined necessary by my physician:  Operation/Procedure name (s) Right lateral fusion lumbar 2 - 3, cage, lumbar 5-sacral 1, posterior interbody fusion with instrumentation lumbar 1- Pelvis, cages and allograft  on Dulce Maria Vines   2. I recognize that during the surgical operation/procedure, unforeseen conditions may necessitate additional or different procedures than those listed above. I, therefore, further authorize and request that the above-named surgeon, assistants, or designees perform such procedures as are, in their judgment, necessary and desirable. 3.   My surgeon/physician has discussed prior to my surgery the potential benefits, risks and side effects of this procedure; the likelihood of achieving goals; and potential problems that might occur during recuperation. They also discussed reasonable alternatives to the procedure, including risks, benefits, and side effects related to the alternatives and risks related to not receiving this procedure. I have had all my questions answered and I acknowledge that no guarantee has been made as to the result that may be obtained. 4.   Should the need arise during my operation or immediate post-operative period, I also consent to the administration of blood and/or blood products.   Further, I understand that despite careful testing and screening of blood or blood products by collecting agencies, I may still be subject to ill effects as a result of receiving a blood transfusion and/or blood products. The following are some, but not all, of the potential risks that can occur: fever and allergic reactions, hemolytic reactions, transmission of diseases such as Hepatitis, AIDS and Cytomegalovirus (CMV) and fluid overload. In the event that I wish to have an autologous transfusion of my own blood, or a directed donor transfusion. I will discuss this with my physician. 5.   I authorize the use of any specimen, organs, tissues, body parts or foreign objects that may be removed from my body during the operation/procedure for diagnosis, research or teaching purposes and their subsequent disposal by hospital authorities. I also authorize the release of specimen test results and/or written reports to my treating physician on the hospital medical staff or other referring or consulting physicians involved in my care, at the discretion of the Pathologist or my treating physician. 6.   I consent to the photographing or videotaping of the operations or procedures to be performed, including appropriate portions of my body for medical, scientific, or educational purposes, provided my identity is not revealed by the pictures or by descriptive texts accompanying them. If the procedure has been photographed/videotaped, the surgeon will obtain the original picture, image, videotape or CD. The hospital will not be responsible for storage, release or maintenance of the picture, image, tape or CD.    7.   I consent to the presence of a  or observers in the operating room as deemed necessary by my physician or their designees. 8.   I recognize that in the event my procedure results in extended X-Ray/fluoroscopy time, I may develop a skin reaction. 9.  If I have a Do Not Attempt Resuscitation (DNAR) order in place, that status will be suspended while in the operating room, procedural suite, and during the recovery period unless otherwise explicitly stated by me (or a person authorized to consent on my behalf). The surgeon or my attending physician will determine when the applicable recovery period ends for purposes of reinstating the DNAR order. 10. Patients having a sterilization procedure: I understand that if the procedure is successful the results will be permanent and it will therefore be impossible for me to inseminate, conceive, or bear children. I also understand that the procedure is intended to result in sterility, although the result has not been guaranteed. 11. I acknowledge that my physician has explained sedation/analgesia administration to me including the risk and benefits I consent to the administration of sedation/analgesia as may be necessary or desirable in the judgment of my physician. I CERTIFY THAT I HAVE READ AND FULLY UNDERSTAND THE ABOVE CONSENT TO OPERATION and/or OTHER PROCEDURE.    _________________________________________  __________________________________  Signature of Patient     Signature of Responsible Person         ___________________________________         Printed Name of Responsible Person           _________________________________                  Relationship to Patient  _________________________________________  ______________________________  Signature of Witness          Date  Time    STATEMENT OF PHYSICIAN My signature below affirms that prior to the time of the procedure; I have explained to the patient and/or his/her legal representative, the risks and benefits involved in the proposed treatment and any reasonable alternative to the proposed treatment. I have also explained the risks and benefits involved in refusal of the proposed treatment and alternatives to the proposed treatment and have answered the patient's questions.  If I have a significant financial interest in a co-management agreement or a significant financial interest in any product or implant, or other significant relationship used in this procedure/surgery, I have disclosed this and had a discussion with my patient.     _______________________________________________________________ _____________________________  Jimenez Pruitt)                                                                                         (Date)                                   (Time)        Patient Name: Louie Melton    : 3/4/1960   Printed: 2022      Medical Record #: [id number]                                              Page 1 of 1